# Patient Record
Sex: FEMALE | Race: WHITE | Employment: OTHER | URBAN - METROPOLITAN AREA
[De-identification: names, ages, dates, MRNs, and addresses within clinical notes are randomized per-mention and may not be internally consistent; named-entity substitution may affect disease eponyms.]

---

## 2019-09-24 ENCOUNTER — HOSPITAL ENCOUNTER (OUTPATIENT)
Age: 64
Setting detail: SPECIMEN
Discharge: HOME | End: 2019-09-24
Payer: COMMERCIAL

## 2019-09-25 LAB
CASE NUMBER:: NORMAL
SPECIMEN DESCRIPTION: NORMAL
SURGICAL PATHOLOGY REPORT: NORMAL

## 2019-12-23 ENCOUNTER — HOSPITAL ENCOUNTER (OUTPATIENT)
Age: 64
Setting detail: SPECIMEN
Discharge: HOME | End: 2019-12-23
Payer: COMMERCIAL

## 2019-12-23 LAB
ABSOLUTE EOS #: 0.18 K/UL (ref 0–0.44)
ABSOLUTE IMMATURE GRANULOCYTE: <0.03 K/UL (ref 0–0.3)
ABSOLUTE LYMPH #: 1.92 K/UL (ref 1.1–3.7)
ABSOLUTE MONO #: 0.65 K/UL (ref 0.1–1.2)
ANION GAP SERPL CALCULATED.3IONS-SCNC: 13 MMOL/L (ref 9–17)
BASOPHILS # BLD: 1 % (ref 0–2)
BASOPHILS ABSOLUTE: 0.07 K/UL (ref 0–0.2)
BUN BLDV-MCNC: 17 MG/DL (ref 8–23)
BUN/CREAT BLD: ABNORMAL (ref 9–20)
C-REACTIVE PROTEIN: 1.5 MG/L (ref 0–5)
CALCIUM SERPL-MCNC: 9.4 MG/DL (ref 8.6–10.4)
CHLORIDE BLD-SCNC: 101 MMOL/L (ref 98–107)
CO2: 27 MMOL/L (ref 20–31)
CREAT SERPL-MCNC: 0.55 MG/DL (ref 0.5–0.9)
DIFFERENTIAL TYPE: NORMAL
EOSINOPHILS RELATIVE PERCENT: 3 % (ref 1–4)
GFR AFRICAN AMERICAN: >60 ML/MIN
GFR NON-AFRICAN AMERICAN: >60 ML/MIN
GFR SERPL CREATININE-BSD FRML MDRD: ABNORMAL ML/MIN/{1.73_M2}
GFR SERPL CREATININE-BSD FRML MDRD: ABNORMAL ML/MIN/{1.73_M2}
GLUCOSE BLD-MCNC: 315 MG/DL (ref 70–99)
HCT VFR BLD CALC: 39.2 % (ref 36.3–47.1)
HEMOGLOBIN: 12.2 G/DL (ref 11.9–15.1)
IMMATURE GRANULOCYTES: 0 %
LYMPHOCYTES # BLD: 36 % (ref 24–43)
MCH RBC QN AUTO: 30.7 PG (ref 25.2–33.5)
MCHC RBC AUTO-ENTMCNC: 31.1 G/DL (ref 28.4–34.8)
MCV RBC AUTO: 98.5 FL (ref 82.6–102.9)
MONOCYTES # BLD: 12 % (ref 3–12)
NRBC AUTOMATED: 0 PER 100 WBC
PDW BLD-RTO: 14.4 % (ref 11.8–14.4)
PLATELET # BLD: 386 K/UL (ref 138–453)
PLATELET ESTIMATE: NORMAL
PMV BLD AUTO: 10.9 FL (ref 8.1–13.5)
POTASSIUM SERPL-SCNC: 5.2 MMOL/L (ref 3.7–5.3)
RBC # BLD: 3.98 M/UL (ref 3.95–5.11)
RBC # BLD: NORMAL 10*6/UL
SEG NEUTROPHILS: 48 % (ref 36–65)
SEGMENTED NEUTROPHILS ABSOLUTE COUNT: 2.49 K/UL (ref 1.5–8.1)
SODIUM BLD-SCNC: 141 MMOL/L (ref 135–144)
WBC # BLD: 5.3 K/UL (ref 3.5–11.3)
WBC # BLD: NORMAL 10*3/UL

## 2019-12-30 ENCOUNTER — TELEPHONE (OUTPATIENT)
Dept: INFECTIOUS DISEASES | Age: 64
End: 2019-12-30

## 2020-01-06 ENCOUNTER — HOSPITAL ENCOUNTER (OUTPATIENT)
Age: 65
Setting detail: SPECIMEN
Discharge: HOME | End: 2020-01-06
Payer: COMMERCIAL

## 2020-01-06 LAB
ABSOLUTE EOS #: 0.41 K/UL (ref 0–0.44)
ABSOLUTE IMMATURE GRANULOCYTE: <0.03 K/UL (ref 0–0.3)
ABSOLUTE LYMPH #: 1.52 K/UL (ref 1.1–3.7)
ABSOLUTE MONO #: 0.55 K/UL (ref 0.1–1.2)
ANION GAP SERPL CALCULATED.3IONS-SCNC: 13 MMOL/L (ref 9–17)
BASOPHILS # BLD: 1 % (ref 0–2)
BASOPHILS ABSOLUTE: 0.06 K/UL (ref 0–0.2)
BUN BLDV-MCNC: 24 MG/DL (ref 8–23)
BUN/CREAT BLD: ABNORMAL (ref 9–20)
C-REACTIVE PROTEIN: 0.5 MG/L (ref 0–5)
CALCIUM SERPL-MCNC: 9.3 MG/DL (ref 8.6–10.4)
CHLORIDE BLD-SCNC: 105 MMOL/L (ref 98–107)
CO2: 24 MMOL/L (ref 20–31)
CREAT SERPL-MCNC: 0.56 MG/DL (ref 0.5–0.9)
DIFFERENTIAL TYPE: ABNORMAL
EOSINOPHILS RELATIVE PERCENT: 6 % (ref 1–4)
GFR AFRICAN AMERICAN: >60 ML/MIN
GFR NON-AFRICAN AMERICAN: >60 ML/MIN
GFR SERPL CREATININE-BSD FRML MDRD: ABNORMAL ML/MIN/{1.73_M2}
GFR SERPL CREATININE-BSD FRML MDRD: ABNORMAL ML/MIN/{1.73_M2}
GLUCOSE BLD-MCNC: 256 MG/DL (ref 70–99)
HCT VFR BLD CALC: 44.6 % (ref 36.3–47.1)
HEMOGLOBIN: 13.3 G/DL (ref 11.9–15.1)
IMMATURE GRANULOCYTES: 0 %
LYMPHOCYTES # BLD: 23 % (ref 24–43)
MCH RBC QN AUTO: 30.4 PG (ref 25.2–33.5)
MCHC RBC AUTO-ENTMCNC: 29.8 G/DL (ref 28.4–34.8)
MCV RBC AUTO: 102.1 FL (ref 82.6–102.9)
MONOCYTES # BLD: 8 % (ref 3–12)
NRBC AUTOMATED: 0 PER 100 WBC
PDW BLD-RTO: 14.5 % (ref 11.8–14.4)
PLATELET # BLD: 203 K/UL (ref 138–453)
PLATELET ESTIMATE: ABNORMAL
PMV BLD AUTO: 11.9 FL (ref 8.1–13.5)
POTASSIUM SERPL-SCNC: 4.8 MMOL/L (ref 3.7–5.3)
RBC # BLD: 4.37 M/UL (ref 3.95–5.11)
RBC # BLD: ABNORMAL 10*6/UL
SEG NEUTROPHILS: 62 % (ref 36–65)
SEGMENTED NEUTROPHILS ABSOLUTE COUNT: 4.19 K/UL (ref 1.5–8.1)
SODIUM BLD-SCNC: 142 MMOL/L (ref 135–144)
WBC # BLD: 6.8 K/UL (ref 3.5–11.3)
WBC # BLD: ABNORMAL 10*3/UL

## 2020-01-08 ENCOUNTER — OFFICE VISIT (OUTPATIENT)
Dept: INFECTIOUS DISEASES | Age: 65
End: 2020-01-08
Payer: COMMERCIAL

## 2020-01-08 VITALS
DIASTOLIC BLOOD PRESSURE: 46 MMHG | HEART RATE: 87 BPM | WEIGHT: 134 LBS | TEMPERATURE: 98.4 F | BODY MASS INDEX: 25.3 KG/M2 | SYSTOLIC BLOOD PRESSURE: 83 MMHG | HEIGHT: 61 IN

## 2020-01-08 PROCEDURE — 99214 OFFICE O/P EST MOD 30 MIN: CPT | Performed by: INTERNAL MEDICINE

## 2020-01-08 RX ORDER — TRAZODONE HYDROCHLORIDE 50 MG/1
TABLET ORAL
COMMUNITY
Start: 2019-12-11

## 2020-01-08 RX ORDER — ESCITALOPRAM OXALATE 20 MG/1
TABLET ORAL
COMMUNITY
Start: 2019-12-11

## 2020-01-08 RX ORDER — ATORVASTATIN CALCIUM 80 MG/1
80 TABLET, FILM COATED ORAL
COMMUNITY
Start: 2015-03-19

## 2020-01-08 RX ORDER — CLINDAMYCIN HYDROCHLORIDE 300 MG/1
CAPSULE ORAL
COMMUNITY
Start: 2019-12-09

## 2020-01-08 ASSESSMENT — ENCOUNTER SYMPTOMS
WHEEZING: 1
CONSTIPATION: 1
COUGH: 1

## 2020-01-08 NOTE — PROGRESS NOTES
this on January 9, 2020. She comes in today and does not report any acute issues. She continues on the IV antimicrobial therapy and she is tolerating it well with no nausea vomiting or diarrhea. She does not have any pain at all at the left shoulder and the surgical incision is healing well with no cellulitis or drainage noted. She does report some chills occasionally, she also reports a chronic cough and wheezing at times. I have personally reviewedthe past medical history, medications, social history, and I have updated the database accordingly. Past Medical History:     Past Medical History:   Diagnosis Date    Arthritis     Asthma     [facility]estive [facility] failure) [facility]     COPD (chronic obstructive pulmonary disease) ([facility]     Diabetes mellitus ([facility]     GERD (gastroesophageal reflux disease)     Hypertension     MRSA (methicillin resistant staph aureus) culture positive     MSSA (methicillin susceptible Staphylococcus aureus)     Psychiatric problem     Shoulder abscess     MSSA     Medications:     Current Outpatient Medications   Medication Sig Dispense Refill    traZODone (DESYREL) 50 MG tablet       escitalopram (LEXAPRO) 20 MG tablet       clindamycin (CLEOCIN) 300 MG capsule take 2 capsules by mouth every 8 hours      atorvastatin (LIPITOR) 80 MG tablet Take 80 mg by mouth      HYDROcodone-acetaminophen (NORCO) 5-325 MG per tablet Take 1 tablet by mouth every 6 hours as needed for Pain      pantoprazole (PROTONIX) 40 MG tablet Take 1 tablet by mouth every morning (before breakfast). 30 tablet 0    Calcium Carb-Cholecalciferol (OYSTER SHELL CALCIUM/VITAMIN D) 250-125 MG-UNIT per tablet Take 1 tablet by mouth daily. 30 tablet 0    QUEtiapine (SEROQUEL) 200 MG tablet Take 1 tablet by mouth nightly. 60 tablet 0    sertraline (ZOLOFT) 50 MG tablet Take 1 tablet by mouth daily.  30 tablet 0    ipratropium-albuterol (DUONEB) 0.5-2.5 (3) MG/3ML SOLN nebulizer solution Inhale 3 mLs Palpations: Abdomen is soft. Comments: Obese abdomen   Skin:     General: Skin is warm and dry. Comments: The left anterior shoulder does have a healing skin lesion from previous I&D surgery   Neurological:      Mental Status: She is alert and oriented to person, place, and time. Laboratory studies :  Medical Decision Making:   I have independently reviewed the following labs:  CBC with Differential:  Lab Results   Component Value Date    WBC 6.8 01/06/2020    WBC 5.3 12/23/2019    HGB 13.3 01/06/2020    HGB 12.2 12/23/2019    HCT 44.6 01/06/2020    HCT 39.2 12/23/2019     01/06/2020     12/23/2019     12/05/2011     12/04/2011    LYMPHOPCT 23 01/06/2020    LYMPHOPCT 36 12/23/2019    MONOPCT 8 01/06/2020    MONOPCT 12 12/23/2019       BMP:  Lab Results   Component Value Date     01/06/2020     12/23/2019    K 4.8 01/06/2020    K 5.2 12/23/2019     01/06/2020     12/23/2019    CO2 24 01/06/2020    CO2 27 12/23/2019    BUN 24 01/06/2020    BUN 17 12/23/2019    CREATININE 0.56 01/06/2020    CREATININE 0.55 12/23/2019    MG 1.6 12/04/2011       Hepatic Function Panel:   Lab Results   Component Value Date    LABALBU 4.3 10/27/2012    BILITOT 0.35 10/27/2012    ALKPHOS 96 10/27/2012    ALT 27 10/27/2012    AST 16 10/27/2012       Lab Results   Component Value Date    CRP 0.5 01/06/2020     No results found for: Darlyn Graham      Thank you for allowing us to participate in the care of this patient. Pleasecall with questions. Louis Kenny MD  Perfect [employer] messaging: [phone]    This note is created with the assistance of a speech recognition program.  While intending to generate a document that actually reflects the content ofthe visit, the document can still have some errors including those of syntax and sound a like substitutions which may escape proof reading.   It such instances, actual meaning can be extrapolated by contextual diversion.

## 2020-01-08 NOTE — PATIENT INSTRUCTIONS
Please let [place] [facility] know that PICC can be removed after doses complete 1/9/20- letter provided- included with AVS  Please give to home care 1/9/20

## 2020-01-17 ENCOUNTER — TELEPHONE (OUTPATIENT)
Dept: INFECTIOUS DISEASES | Age: 65
End: 2020-01-17

## 2020-01-17 NOTE — TELEPHONE ENCOUNTER
THE PATIENT SISTER CALLED AND STATED SHE IS TAKING HER SISTER TO GET THE BLOOD DRAWN AT [facility] AND THEY NEED ORDERS I HAVE FAXED THEM [phone]

## 2020-02-19 ENCOUNTER — OFFICE VISIT (OUTPATIENT)
Dept: INFECTIOUS DISEASES | Age: 65
End: 2020-02-19
Payer: COMMERCIAL

## 2020-02-19 VITALS
OXYGEN SATURATION: 97 % | DIASTOLIC BLOOD PRESSURE: 50 MMHG | BODY MASS INDEX: 25.82 KG/M2 | WEIGHT: 136.6 LBS | HEART RATE: 93 BPM | TEMPERATURE: 96.6 F | SYSTOLIC BLOOD PRESSURE: 103 MMHG

## 2020-02-19 PROCEDURE — 99213 OFFICE O/P EST LOW 20 MIN: CPT | Performed by: INTERNAL MEDICINE

## 2020-02-19 NOTE — PROGRESS NOTES
[facility]  Office Progress Note  Today's Date and Time: 2/19/2020, 11:25 AM    Impression:     1. Abscess of left shoulder    2. Bacteremia due to methicillin susceptible Staphylococcus aureus (MSSA)         Recommendations   · The patient completed a 4-week course of intravenous cefazolin on 1/9/20   · She was to have 2 sets of blood cultures done as a surveillance measure and she never had these completed  · Patient has been off antimicrobial therapy for over a month now and is actually doing well with no signs of infection at the shoulder and no systemic signs of infection. · See no point in doing the surveillance blood cultures at this point in time  · She can otherwise follow-up with me on an as-needed basis. I have ordered the following medications/ labs:  No orders of the defined types were placed in this encounter. No orders of the defined types were placed in this encounter. Chief complaint/reason for consultation:     Chief Complaint   Patient presents with    Follow-up     4 week follow up         History of Present Illness:   Aggie Hart is a 62y.o.-year-old female who I am seeing in follow-up for a left shoulder infection with associated MSSA bacteremia. Lakisha Whitten has a history of diabetes mellitus, hypertension, COPD, CHF, MRSA skin and soft tissue infections and she was hospitalized at [facility] in mid December with MSSA sepsis and she also had an anterior shoulder abscess. She underwent surgical I&D with purulent material drained of the subcutaneous abscess with no joint involvement noted. The patient is seen and evaluated at bedside she is awake and alert in no acute distress. She does not report any subjective fever or chills. No cough or shortness of breath. No abdominal pain nausea vomiting or diarrhea. I have personally reviewedthe past medical history, medications, social history, and I have updated the database accordingly.   Past

## 2020-02-22 ASSESSMENT — ENCOUNTER SYMPTOMS
GASTROINTESTINAL NEGATIVE: 1
RESPIRATORY NEGATIVE: 1

## 2022-12-23 ENCOUNTER — APPOINTMENT (OUTPATIENT)
Dept: CT IMAGING | Age: 67
DRG: 177 | End: 2022-12-23
Payer: COMMERCIAL

## 2022-12-23 ENCOUNTER — APPOINTMENT (OUTPATIENT)
Dept: GENERAL RADIOLOGY | Age: 67
DRG: 177 | End: 2022-12-23
Payer: COMMERCIAL

## 2022-12-23 ENCOUNTER — HOSPITAL ENCOUNTER (INPATIENT)
Age: 67
LOS: 4 days | Discharge: HOME | DRG: 177 | End: 2022-12-27
Attending: EMERGENCY MEDICINE | Admitting: INTERNAL MEDICINE
Payer: COMMERCIAL

## 2022-12-23 DIAGNOSIS — J69.0 ASPIRATION PNEUMONIA DUE TO GASTRIC SECRETIONS, UNSPECIFIED LATERALITY, UNSPECIFIED PART OF LUNG (HCC): ICD-10-CM

## 2022-12-23 DIAGNOSIS — T40.2X4A OPIOID OVERDOSE, UNDETERMINED INTENT, INITIAL ENCOUNTER (HCC): ICD-10-CM

## 2022-12-23 DIAGNOSIS — E16.2 HYPOGLYCEMIA: ICD-10-CM

## 2022-12-23 DIAGNOSIS — R41.82 ALTERED MENTAL STATUS, UNSPECIFIED ALTERED MENTAL STATUS TYPE: Primary | ICD-10-CM

## 2022-12-23 LAB
ABSOLUTE EOS #: 0.3 K/UL (ref 0–0.44)
ABSOLUTE IMMATURE GRANULOCYTE: <0.03 K/UL (ref 0–0.3)
ABSOLUTE LYMPH #: 1.35 K/UL (ref 1.1–3.7)
ABSOLUTE MONO #: 0.89 K/UL (ref 0.1–1.2)
ALBUMIN SERPL-MCNC: 4.3 G/DL (ref 3.5–5.2)
ALBUMIN/GLOBULIN RATIO: 1.5 (ref 1–2.5)
ALP BLD-CCNC: 152 U/L (ref 35–104)
ALT SERPL-CCNC: 19 U/L (ref 5–33)
ANION GAP SERPL CALCULATED.3IONS-SCNC: 11 MMOL/L (ref 9–17)
AST SERPL-CCNC: 22 U/L
BASOPHILS # BLD: 0 % (ref 0–2)
BASOPHILS ABSOLUTE: 0.04 K/UL (ref 0–0.2)
BETA-HYDROXYBUTYRATE: 0.07 MMOL/L (ref 0.02–0.27)
BILIRUB SERPL-MCNC: 0.2 MG/DL (ref 0.3–1.2)
BILIRUBIN DIRECT: <0.1 MG/DL
BILIRUBIN, INDIRECT: ABNORMAL MG/DL (ref 0–1)
BUN BLDV-MCNC: 29 MG/DL (ref 8–23)
CALCIUM SERPL-MCNC: 9.7 MG/DL (ref 8.6–10.4)
CARBOXYHEMOGLOBIN: 8 % (ref 0–5)
CHLORIDE BLD-SCNC: 105 MMOL/L (ref 98–107)
CO2: 27 MMOL/L (ref 20–31)
CREAT SERPL-MCNC: 0.64 MG/DL (ref 0.5–0.9)
EOSINOPHILS RELATIVE PERCENT: 3 % (ref 1–4)
ETHANOL PERCENT: <0.01 %
ETHANOL: <10 MG/DL
FIO2: ABNORMAL
GFR SERPL CREATININE-BSD FRML MDRD: 60 ML/MIN/1.73M2
GLUCOSE BLD-MCNC: 36 MG/DL (ref 70–99)
GLUCOSE BLD-MCNC: 88 MG/DL
GLUCOSE BLD-MCNC: 88 MG/DL (ref 65–105)
GLUCOSE BLD-MCNC: 94 MG/DL
GLUCOSE BLD-MCNC: 94 MG/DL (ref 65–105)
HCO3 VENOUS: 28.2 MMOL/L (ref 24–30)
HCT VFR BLD CALC: 46.8 % (ref 36.3–47.1)
HEMOGLOBIN: 14.3 G/DL (ref 11.9–15.1)
IMMATURE GRANULOCYTES: 0 %
LACTIC ACID, WHOLE BLOOD: 1.7 MMOL/L (ref 0.7–2.1)
LIPASE: 16 U/L (ref 13–60)
LYMPHOCYTES # BLD: 12 % (ref 24–43)
MAGNESIUM: 2.2 MG/DL (ref 1.6–2.6)
MCH RBC QN AUTO: 30.4 PG (ref 25.2–33.5)
MCHC RBC AUTO-ENTMCNC: 30.6 G/DL (ref 28.4–34.8)
MCV RBC AUTO: 99.4 FL (ref 82.6–102.9)
MONOCYTES # BLD: 8 % (ref 3–12)
NRBC AUTOMATED: 0 PER 100 WBC
O2 SAT, VEN: 89.5 % (ref 60–85)
PATIENT TEMP: 37
PCO2, VEN: 59.8 MM HG (ref 39–55)
PDW BLD-RTO: 15.1 % (ref 11.8–14.4)
PH VENOUS: 7.3 (ref 7.32–7.42)
PLATELET # BLD: ABNORMAL K/UL (ref 138–453)
PLATELET, FLUORESCENCE: NORMAL K/UL (ref 138–453)
PO2, VEN: 57.9 MM HG (ref 30–50)
POSITIVE BASE EXCESS, VEN: 0.7 MMOL/L (ref 0–2)
POTASSIUM SERPL-SCNC: 4.6 MMOL/L (ref 3.7–5.3)
PRO-BNP: 65 PG/ML
RBC # BLD: 4.71 M/UL (ref 3.95–5.11)
RBC # BLD: ABNORMAL 10*6/UL
SEG NEUTROPHILS: 76 % (ref 36–65)
SEGMENTED NEUTROPHILS ABSOLUTE COUNT: 8.28 K/UL (ref 1.5–8.1)
SODIUM BLD-SCNC: 143 MMOL/L (ref 135–144)
TOTAL PROTEIN: 7.1 G/DL (ref 6.4–8.3)
TROPONIN, HIGH SENSITIVITY: 21 NG/L (ref 0–14)
TSH SERPL DL<=0.05 MIU/L-ACNC: 2.62 UIU/ML (ref 0.3–5)
WBC # BLD: 10.9 K/UL (ref 3.5–11.3)

## 2022-12-23 PROCEDURE — 84484 ASSAY OF TROPONIN QUANT: CPT

## 2022-12-23 PROCEDURE — 83735 ASSAY OF MAGNESIUM: CPT

## 2022-12-23 PROCEDURE — 99285 EMERGENCY DEPT VISIT HI MDM: CPT

## 2022-12-23 PROCEDURE — 83690 ASSAY OF LIPASE: CPT

## 2022-12-23 PROCEDURE — 82947 ASSAY GLUCOSE BLOOD QUANT: CPT

## 2022-12-23 PROCEDURE — 2000000000 HC ICU R&B

## 2022-12-23 PROCEDURE — 82550 ASSAY OF CK (CPK): CPT

## 2022-12-23 PROCEDURE — 83880 ASSAY OF NATRIURETIC PEPTIDE: CPT

## 2022-12-23 PROCEDURE — 6360000002 HC RX W HCPCS: Performed by: STUDENT IN AN ORGANIZED HEALTH CARE EDUCATION/TRAINING PROGRAM

## 2022-12-23 PROCEDURE — 82805 BLOOD GASES W/O2 SATURATION: CPT

## 2022-12-23 PROCEDURE — 83874 ASSAY OF MYOGLOBIN: CPT

## 2022-12-23 PROCEDURE — G0480 DRUG TEST DEF 1-7 CLASSES: HCPCS

## 2022-12-23 PROCEDURE — 85055 RETICULATED PLATELET ASSAY: CPT

## 2022-12-23 PROCEDURE — 71045 X-RAY EXAM CHEST 1 VIEW: CPT

## 2022-12-23 PROCEDURE — 82010 KETONE BODYS QUAN: CPT

## 2022-12-23 PROCEDURE — 82533 TOTAL CORTISOL: CPT

## 2022-12-23 PROCEDURE — 2580000003 HC RX 258: Performed by: STUDENT IN AN ORGANIZED HEALTH CARE EDUCATION/TRAINING PROGRAM

## 2022-12-23 PROCEDURE — 70450 CT HEAD/BRAIN W/O DYE: CPT

## 2022-12-23 PROCEDURE — 80048 BASIC METABOLIC PNL TOTAL CA: CPT

## 2022-12-23 PROCEDURE — 83605 ASSAY OF LACTIC ACID: CPT

## 2022-12-23 PROCEDURE — 85025 COMPLETE CBC W/AUTO DIFF WBC: CPT

## 2022-12-23 PROCEDURE — 93005 ELECTROCARDIOGRAM TRACING: CPT | Performed by: STUDENT IN AN ORGANIZED HEALTH CARE EDUCATION/TRAINING PROGRAM

## 2022-12-23 PROCEDURE — 80076 HEPATIC FUNCTION PANEL: CPT

## 2022-12-23 PROCEDURE — 87040 BLOOD CULTURE FOR BACTERIA: CPT

## 2022-12-23 PROCEDURE — 36415 COLL VENOUS BLD VENIPUNCTURE: CPT

## 2022-12-23 PROCEDURE — 84443 ASSAY THYROID STIM HORMONE: CPT

## 2022-12-23 PROCEDURE — 96372 THER/PROPH/DIAG INJ SC/IM: CPT

## 2022-12-23 PROCEDURE — 2500000003 HC RX 250 WO HCPCS

## 2022-12-23 RX ORDER — MAGNESIUM SULFATE IN WATER 40 MG/ML
2000 INJECTION, SOLUTION INTRAVENOUS ONCE
Status: COMPLETED | OUTPATIENT
Start: 2022-12-23 | End: 2022-12-24

## 2022-12-23 RX ORDER — 0.9 % SODIUM CHLORIDE 0.9 %
1000 INTRAVENOUS SOLUTION INTRAVENOUS ONCE
Status: COMPLETED | OUTPATIENT
Start: 2022-12-23 | End: 2022-12-24

## 2022-12-23 RX ORDER — HALOPERIDOL 5 MG/ML
5 INJECTION INTRAMUSCULAR ONCE
Status: COMPLETED | OUTPATIENT
Start: 2022-12-23 | End: 2022-12-23

## 2022-12-23 RX ORDER — DEXTROSE MONOHYDRATE 25 G/50ML
25 INJECTION, SOLUTION INTRAVENOUS ONCE
Status: COMPLETED | OUTPATIENT
Start: 2022-12-23 | End: 2022-12-23

## 2022-12-23 RX ORDER — DEXTROSE MONOHYDRATE 25 G/50ML
INJECTION, SOLUTION INTRAVENOUS
Status: COMPLETED
Start: 2022-12-23 | End: 2022-12-23

## 2022-12-23 RX ORDER — KETOROLAC TROMETHAMINE 15 MG/ML
15 INJECTION, SOLUTION INTRAMUSCULAR; INTRAVENOUS ONCE
Status: COMPLETED | OUTPATIENT
Start: 2022-12-23 | End: 2022-12-24

## 2022-12-23 RX ORDER — DEXTROSE MONOHYDRATE 100 MG/ML
INJECTION, SOLUTION INTRAVENOUS CONTINUOUS PRN
Status: DISCONTINUED | OUTPATIENT
Start: 2022-12-23 | End: 2022-12-27 | Stop reason: HOSPADM

## 2022-12-23 RX ADMIN — HALOPERIDOL LACTATE 5 MG: 5 INJECTION, SOLUTION INTRAMUSCULAR at 22:33

## 2022-12-23 RX ADMIN — DEXTROSE MONOHYDRATE 25 G: 25 INJECTION, SOLUTION INTRAVENOUS at 22:04

## 2022-12-23 RX ADMIN — SODIUM CHLORIDE 1000 ML: 9 INJECTION, SOLUTION INTRAVENOUS at 22:30

## 2022-12-24 ENCOUNTER — APPOINTMENT (OUTPATIENT)
Dept: GENERAL RADIOLOGY | Age: 67
DRG: 177 | End: 2022-12-24
Payer: COMMERCIAL

## 2022-12-24 PROBLEM — G92.8 TOXIC METABOLIC ENCEPHALOPATHY: Status: ACTIVE | Noted: 2022-12-24

## 2022-12-24 PROBLEM — I21.4 NSTEMI (NON-ST ELEVATED MYOCARDIAL INFARCTION) (HCC): Status: ACTIVE | Noted: 2022-12-24

## 2022-12-24 LAB
ABSOLUTE EOS #: 0.04 K/UL (ref 0–0.44)
ABSOLUTE IMMATURE GRANULOCYTE: 0.03 K/UL (ref 0–0.3)
ABSOLUTE LYMPH #: 1.12 K/UL (ref 1.1–3.7)
ABSOLUTE MONO #: 0.83 K/UL (ref 0.1–1.2)
ADENOVIRUS PCR: NOT DETECTED
ALBUMIN SERPL-MCNC: 3.4 G/DL (ref 3.5–5.2)
ALBUMIN/GLOBULIN RATIO: 1.4 (ref 1–2.5)
ALP BLD-CCNC: 123 U/L (ref 35–104)
ALT SERPL-CCNC: 15 U/L (ref 5–33)
AMPHETAMINE SCREEN URINE: NEGATIVE
ANION GAP SERPL CALCULATED.3IONS-SCNC: 10 MMOL/L (ref 9–17)
AST SERPL-CCNC: 25 U/L
BACTERIA: ABNORMAL
BARBITURATE SCREEN URINE: NEGATIVE
BASOPHILS # BLD: 0 % (ref 0–2)
BASOPHILS ABSOLUTE: 0.03 K/UL (ref 0–0.2)
BENZODIAZEPINE SCREEN, URINE: NEGATIVE
BILIRUB SERPL-MCNC: 0.3 MG/DL (ref 0.3–1.2)
BILIRUBIN URINE: NEGATIVE
BORDETELLA PARAPERTUSSIS: NOT DETECTED
BORDETELLA PERTUSSIS PCR: NOT DETECTED
BUN BLDV-MCNC: 27 MG/DL (ref 8–23)
CALCIUM IONIZED: 1.31 MMOL/L (ref 1.13–1.33)
CALCIUM SERPL-MCNC: 8.6 MG/DL (ref 8.6–10.4)
CANNABINOID SCREEN URINE: NEGATIVE
CASTS UA: ABNORMAL /LPF (ref 0–8)
CHLAMYDIA PNEUMONIAE BY PCR: NOT DETECTED
CHLORIDE BLD-SCNC: 108 MMOL/L (ref 98–107)
CO2: 24 MMOL/L (ref 20–31)
COCAINE METABOLITE, URINE: NEGATIVE
COLOR: YELLOW
CORONAVIRUS 229E PCR: NOT DETECTED
CORONAVIRUS HKU1 PCR: NOT DETECTED
CORONAVIRUS NL63 PCR: NOT DETECTED
CORONAVIRUS OC43 PCR: NOT DETECTED
CORTISOL: 37.8 UG/DL (ref 2.7–18.4)
CREAT SERPL-MCNC: 0.67 MG/DL (ref 0.5–0.9)
EKG ATRIAL RATE: 119 BPM
EKG ATRIAL RATE: 79 BPM
EKG ATRIAL RATE: 80 BPM
EKG P AXIS: 77 DEGREES
EKG P AXIS: 84 DEGREES
EKG P AXIS: 85 DEGREES
EKG P-R INTERVAL: 164 MS
EKG P-R INTERVAL: 168 MS
EKG P-R INTERVAL: 186 MS
EKG Q-T INTERVAL: 292 MS
EKG Q-T INTERVAL: 392 MS
EKG Q-T INTERVAL: 394 MS
EKG QRS DURATION: 80 MS
EKG QRS DURATION: 82 MS
EKG QRS DURATION: 88 MS
EKG QTC CALCULATION (BAZETT): 410 MS
EKG QTC CALCULATION (BAZETT): 449 MS
EKG QTC CALCULATION (BAZETT): 454 MS
EKG R AXIS: 70 DEGREES
EKG R AXIS: 80 DEGREES
EKG R AXIS: 80 DEGREES
EKG T AXIS: 28 DEGREES
EKG T AXIS: 87 DEGREES
EKG T AXIS: 91 DEGREES
EKG VENTRICULAR RATE: 119 BPM
EKG VENTRICULAR RATE: 79 BPM
EKG VENTRICULAR RATE: 80 BPM
EOSINOPHILS RELATIVE PERCENT: 1 % (ref 1–4)
EPITHELIAL CELLS UA: ABNORMAL /HPF (ref 0–5)
FENTANYL URINE: NEGATIVE
GFR SERPL CREATININE-BSD FRML MDRD: 60 ML/MIN/1.73M2
GLUCOSE BLD-MCNC: 102 MG/DL (ref 65–105)
GLUCOSE BLD-MCNC: 109 MG/DL (ref 70–99)
GLUCOSE BLD-MCNC: 117 MG/DL (ref 65–105)
GLUCOSE BLD-MCNC: 120 MG/DL (ref 65–105)
GLUCOSE BLD-MCNC: 122 MG/DL (ref 65–105)
GLUCOSE BLD-MCNC: 248 MG/DL (ref 65–105)
GLUCOSE BLD-MCNC: 260 MG/DL (ref 65–105)
GLUCOSE BLD-MCNC: 276 MG/DL (ref 65–105)
GLUCOSE BLD-MCNC: 98 MG/DL (ref 65–105)
GLUCOSE URINE: ABNORMAL
HCT VFR BLD CALC: 39.1 % (ref 36.3–47.1)
HEMOGLOBIN: 11.8 G/DL (ref 11.9–15.1)
HUMAN METAPNEUMOVIRUS PCR: NOT DETECTED
IMMATURE GRANULOCYTES: 0 %
INFLUENZA A BY PCR: NOT DETECTED
INFLUENZA B BY PCR: NOT DETECTED
KETONES, URINE: NEGATIVE
LACTIC ACID, WHOLE BLOOD: 0.8 MMOL/L (ref 0.7–2.1)
LEUKOCYTE ESTERASE, URINE: NEGATIVE
LYMPHOCYTES # BLD: 13 % (ref 24–43)
MAGNESIUM: 2.7 MG/DL (ref 1.6–2.6)
MCH RBC QN AUTO: 30.1 PG (ref 25.2–33.5)
MCHC RBC AUTO-ENTMCNC: 30.2 G/DL (ref 28.4–34.8)
MCV RBC AUTO: 99.7 FL (ref 82.6–102.9)
METHADONE SCREEN, URINE: NEGATIVE
MONOCYTES # BLD: 10 % (ref 3–12)
MRSA, DNA, NASAL: NEGATIVE
MYCOPLASMA PNEUMONIAE PCR: NOT DETECTED
MYOGLOBIN: 333 NG/ML (ref 25–58)
NITRITE, URINE: NEGATIVE
NRBC AUTOMATED: 0 PER 100 WBC
OPIATES, URINE: NEGATIVE
OXYCODONE SCREEN URINE: NEGATIVE
PARAINFLUENZA 1 PCR: NOT DETECTED
PARAINFLUENZA 2 PCR: NOT DETECTED
PARAINFLUENZA 3 PCR: NOT DETECTED
PARAINFLUENZA 4 PCR: NOT DETECTED
PARTIAL THROMBOPLASTIN TIME: 23.9 SEC (ref 20.5–30.5)
PARTIAL THROMBOPLASTIN TIME: 48.1 SEC (ref 20.5–30.5)
PARTIAL THROMBOPLASTIN TIME: 55.2 SEC (ref 20.5–30.5)
PDW BLD-RTO: 14.9 % (ref 11.8–14.4)
PH UA: 7 (ref 5–8)
PHENCYCLIDINE, URINE: NEGATIVE
PLATELET # BLD: 144 K/UL (ref 138–453)
PMV BLD AUTO: 12.2 FL (ref 8.1–13.5)
POTASSIUM SERPL-SCNC: 4.6 MMOL/L (ref 3.7–5.3)
PROTEIN UA: ABNORMAL
RBC # BLD: 3.92 M/UL (ref 3.95–5.11)
RBC # BLD: ABNORMAL 10*6/UL
RBC UA: ABNORMAL /HPF (ref 0–4)
RESP SYNCYTIAL VIRUS PCR: NOT DETECTED
RHINO/ENTEROVIRUS PCR: NOT DETECTED
SARS-COV-2, PCR: NOT DETECTED
SARS-COV-2, RAPID: NOT DETECTED
SEG NEUTROPHILS: 76 % (ref 36–65)
SEGMENTED NEUTROPHILS ABSOLUTE COUNT: 6.58 K/UL (ref 1.5–8.1)
SODIUM BLD-SCNC: 142 MMOL/L (ref 135–144)
SPECIFIC GRAVITY UA: 1.01 (ref 1–1.03)
SPECIMEN DESCRIPTION: NORMAL
TEST INFORMATION: NORMAL
TOTAL CK: 72 U/L (ref 26–192)
TOTAL PROTEIN: 5.8 G/DL (ref 6.4–8.3)
TROPONIN, HIGH SENSITIVITY: 134 NG/L (ref 0–14)
TROPONIN, HIGH SENSITIVITY: 194 NG/L (ref 0–14)
TROPONIN, HIGH SENSITIVITY: 210 NG/L (ref 0–14)
TURBIDITY: CLEAR
URINE HGB: NEGATIVE
UROBILINOGEN, URINE: NORMAL
WBC # BLD: 8.6 K/UL (ref 3.5–11.3)
WBC UA: ABNORMAL /HPF (ref 0–5)

## 2022-12-24 PROCEDURE — 87641 MR-STAPH DNA AMP PROBE: CPT

## 2022-12-24 PROCEDURE — 94761 N-INVAS EAR/PLS OXIMETRY MLT: CPT

## 2022-12-24 PROCEDURE — 36415 COLL VENOUS BLD VENIPUNCTURE: CPT

## 2022-12-24 PROCEDURE — 2580000003 HC RX 258: Performed by: STUDENT IN AN ORGANIZED HEALTH CARE EDUCATION/TRAINING PROGRAM

## 2022-12-24 PROCEDURE — 85025 COMPLETE CBC W/AUTO DIFF WBC: CPT

## 2022-12-24 PROCEDURE — 84484 ASSAY OF TROPONIN QUANT: CPT

## 2022-12-24 PROCEDURE — 82947 ASSAY GLUCOSE BLOOD QUANT: CPT

## 2022-12-24 PROCEDURE — 82330 ASSAY OF CALCIUM: CPT

## 2022-12-24 PROCEDURE — 80307 DRUG TEST PRSMV CHEM ANLYZR: CPT

## 2022-12-24 PROCEDURE — 83735 ASSAY OF MAGNESIUM: CPT

## 2022-12-24 PROCEDURE — 80053 COMPREHEN METABOLIC PANEL: CPT

## 2022-12-24 PROCEDURE — 2700000000 HC OXYGEN THERAPY PER DAY

## 2022-12-24 PROCEDURE — 71045 X-RAY EXAM CHEST 1 VIEW: CPT

## 2022-12-24 PROCEDURE — 81001 URINALYSIS AUTO W/SCOPE: CPT

## 2022-12-24 PROCEDURE — 6360000002 HC RX W HCPCS: Performed by: STUDENT IN AN ORGANIZED HEALTH CARE EDUCATION/TRAINING PROGRAM

## 2022-12-24 PROCEDURE — 93010 ELECTROCARDIOGRAM REPORT: CPT | Performed by: INTERNAL MEDICINE

## 2022-12-24 PROCEDURE — 6370000000 HC RX 637 (ALT 250 FOR IP)

## 2022-12-24 PROCEDURE — 93005 ELECTROCARDIOGRAM TRACING: CPT

## 2022-12-24 PROCEDURE — 6360000002 HC RX W HCPCS

## 2022-12-24 PROCEDURE — 2580000003 HC RX 258

## 2022-12-24 PROCEDURE — 93005 ELECTROCARDIOGRAM TRACING: CPT | Performed by: INTERNAL MEDICINE

## 2022-12-24 PROCEDURE — 0202U NFCT DS 22 TRGT SARS-COV-2: CPT

## 2022-12-24 PROCEDURE — 1200000000 HC SEMI PRIVATE

## 2022-12-24 PROCEDURE — 83605 ASSAY OF LACTIC ACID: CPT

## 2022-12-24 PROCEDURE — 87635 SARS-COV-2 COVID-19 AMP PRB: CPT

## 2022-12-24 PROCEDURE — 85730 THROMBOPLASTIN TIME PARTIAL: CPT

## 2022-12-24 PROCEDURE — 99291 CRITICAL CARE FIRST HOUR: CPT | Performed by: INTERNAL MEDICINE

## 2022-12-24 PROCEDURE — 93306 TTE W/DOPPLER COMPLETE: CPT

## 2022-12-24 RX ORDER — ATORVASTATIN CALCIUM 40 MG/1
40 TABLET, FILM COATED ORAL NIGHTLY
Status: DISCONTINUED | OUTPATIENT
Start: 2022-12-24 | End: 2022-12-27 | Stop reason: HOSPADM

## 2022-12-24 RX ORDER — SODIUM CHLORIDE 0.9 % (FLUSH) 0.9 %
5-40 SYRINGE (ML) INJECTION PRN
Status: DISCONTINUED | OUTPATIENT
Start: 2022-12-24 | End: 2022-12-27 | Stop reason: HOSPADM

## 2022-12-24 RX ORDER — SODIUM CHLORIDE 9 MG/ML
1000 INJECTION, SOLUTION INTRAVENOUS CONTINUOUS
Status: ACTIVE | OUTPATIENT
Start: 2022-12-24 | End: 2022-12-24

## 2022-12-24 RX ORDER — FLUTICASONE FUROATE, UMECLIDINIUM BROMIDE AND VILANTEROL TRIFENATATE 200; 62.5; 25 UG/1; UG/1; UG/1
1 POWDER RESPIRATORY (INHALATION) EVERY 12 HOURS
COMMUNITY

## 2022-12-24 RX ORDER — SUBCUTANEOUS INSULIN PUMP
EACH MISCELLANEOUS
COMMUNITY

## 2022-12-24 RX ORDER — SODIUM CHLORIDE 9 MG/ML
INJECTION, SOLUTION INTRAVENOUS PRN
Status: DISCONTINUED | OUTPATIENT
Start: 2022-12-24 | End: 2022-12-27 | Stop reason: HOSPADM

## 2022-12-24 RX ORDER — HEPARIN SODIUM 1000 [USP'U]/ML
30 INJECTION, SOLUTION INTRAVENOUS; SUBCUTANEOUS PRN
Status: DISCONTINUED | OUTPATIENT
Start: 2022-12-24 | End: 2022-12-26 | Stop reason: SDUPTHER

## 2022-12-24 RX ORDER — MONTELUKAST SODIUM 10 MG/1
10 TABLET ORAL NIGHTLY
COMMUNITY

## 2022-12-24 RX ORDER — SODIUM CHLORIDE 0.9 % (FLUSH) 0.9 %
5-40 SYRINGE (ML) INJECTION EVERY 12 HOURS SCHEDULED
Status: DISCONTINUED | OUTPATIENT
Start: 2022-12-24 | End: 2022-12-27 | Stop reason: HOSPADM

## 2022-12-24 RX ORDER — IPRATROPIUM BROMIDE AND ALBUTEROL SULFATE 2.5; .5 MG/3ML; MG/3ML
1 SOLUTION RESPIRATORY (INHALATION)
Status: DISCONTINUED | OUTPATIENT
Start: 2022-12-24 | End: 2022-12-24

## 2022-12-24 RX ORDER — HEPARIN SODIUM AND DEXTROSE 10000; 5 [USP'U]/100ML; G/100ML
5-30 INJECTION INTRAVENOUS CONTINUOUS
Status: DISCONTINUED | OUTPATIENT
Start: 2022-12-24 | End: 2022-12-26

## 2022-12-24 RX ORDER — ONDANSETRON 2 MG/ML
4 INJECTION INTRAMUSCULAR; INTRAVENOUS EVERY 6 HOURS PRN
Status: DISCONTINUED | OUTPATIENT
Start: 2022-12-24 | End: 2022-12-27 | Stop reason: HOSPADM

## 2022-12-24 RX ORDER — ACETAMINOPHEN 650 MG/1
650 SUPPOSITORY RECTAL EVERY 6 HOURS PRN
Status: DISCONTINUED | OUTPATIENT
Start: 2022-12-24 | End: 2022-12-27 | Stop reason: HOSPADM

## 2022-12-24 RX ORDER — INSULIN LISPRO 100 [IU]/ML
0-4 INJECTION, SOLUTION INTRAVENOUS; SUBCUTANEOUS NIGHTLY
Status: DISCONTINUED | OUTPATIENT
Start: 2022-12-24 | End: 2022-12-26

## 2022-12-24 RX ORDER — HEPARIN SODIUM 1000 [USP'U]/ML
60 INJECTION, SOLUTION INTRAVENOUS; SUBCUTANEOUS PRN
Status: DISCONTINUED | OUTPATIENT
Start: 2022-12-24 | End: 2022-12-26 | Stop reason: SDUPTHER

## 2022-12-24 RX ORDER — ASPIRIN 81 MG/1
81 TABLET, CHEWABLE ORAL DAILY
Status: DISCONTINUED | OUTPATIENT
Start: 2022-12-24 | End: 2022-12-27 | Stop reason: HOSPADM

## 2022-12-24 RX ORDER — INSULIN LISPRO 100 [IU]/ML
0-8 INJECTION, SOLUTION INTRAVENOUS; SUBCUTANEOUS
Status: DISCONTINUED | OUTPATIENT
Start: 2022-12-24 | End: 2022-12-26

## 2022-12-24 RX ORDER — ALBUTEROL SULFATE 2.5 MG/3ML
2.5 SOLUTION RESPIRATORY (INHALATION) EVERY 6 HOURS PRN
Status: DISCONTINUED | OUTPATIENT
Start: 2022-12-24 | End: 2022-12-27 | Stop reason: HOSPADM

## 2022-12-24 RX ORDER — ONDANSETRON 4 MG/1
4 TABLET, ORALLY DISINTEGRATING ORAL EVERY 8 HOURS PRN
Status: DISCONTINUED | OUTPATIENT
Start: 2022-12-24 | End: 2022-12-27 | Stop reason: HOSPADM

## 2022-12-24 RX ORDER — POLYETHYLENE GLYCOL 3350 17 G/17G
17 POWDER, FOR SOLUTION ORAL DAILY PRN
Status: DISCONTINUED | OUTPATIENT
Start: 2022-12-24 | End: 2022-12-27 | Stop reason: HOSPADM

## 2022-12-24 RX ORDER — HEPARIN SODIUM 1000 [USP'U]/ML
60 INJECTION, SOLUTION INTRAVENOUS; SUBCUTANEOUS ONCE
Status: COMPLETED | OUTPATIENT
Start: 2022-12-24 | End: 2022-12-24

## 2022-12-24 RX ORDER — ACETAMINOPHEN 325 MG/1
650 TABLET ORAL EVERY 6 HOURS PRN
Status: DISCONTINUED | OUTPATIENT
Start: 2022-12-24 | End: 2022-12-27 | Stop reason: HOSPADM

## 2022-12-24 RX ORDER — GUAIFENESIN 600 MG/1
1200 TABLET, EXTENDED RELEASE ORAL EVERY 12 HOURS PRN
COMMUNITY

## 2022-12-24 RX ORDER — ENOXAPARIN SODIUM 100 MG/ML
40 INJECTION SUBCUTANEOUS DAILY
Status: DISCONTINUED | OUTPATIENT
Start: 2022-12-24 | End: 2022-12-24

## 2022-12-24 RX ORDER — CHOLECALCIFEROL (VITAMIN D3) 1250 MCG
1 CAPSULE ORAL
COMMUNITY

## 2022-12-24 RX ORDER — DEXTROSE AND SODIUM CHLORIDE 5; .45 G/100ML; G/100ML
INJECTION, SOLUTION INTRAVENOUS CONTINUOUS
Status: DISCONTINUED | OUTPATIENT
Start: 2022-12-24 | End: 2022-12-24

## 2022-12-24 RX ADMIN — HEPARIN SODIUM 1970 UNITS: 1000 INJECTION INTRAVENOUS; SUBCUTANEOUS at 15:13

## 2022-12-24 RX ADMIN — PIPERACILLIN AND TAZOBACTAM 3375 MG: 3; .375 INJECTION, POWDER, FOR SOLUTION INTRAVENOUS at 00:13

## 2022-12-24 RX ADMIN — AMPICILLIN SODIUM AND SULBACTAM SODIUM 3000 MG: 2; 1 INJECTION, POWDER, FOR SOLUTION INTRAMUSCULAR; INTRAVENOUS at 10:40

## 2022-12-24 RX ADMIN — SODIUM CHLORIDE: 9 INJECTION, SOLUTION INTRAVENOUS at 10:39

## 2022-12-24 RX ADMIN — KETOROLAC TROMETHAMINE 15 MG: 15 INJECTION, SOLUTION INTRAMUSCULAR; INTRAVENOUS at 01:51

## 2022-12-24 RX ADMIN — SODIUM CHLORIDE, PRESERVATIVE FREE 10 ML: 5 INJECTION INTRAVENOUS at 08:29

## 2022-12-24 RX ADMIN — MAGNESIUM SULFATE HEPTAHYDRATE 2000 MG: 40 INJECTION, SOLUTION INTRAVENOUS at 01:57

## 2022-12-24 RX ADMIN — HEPARIN SODIUM 3950 UNITS: 1000 INJECTION INTRAVENOUS; SUBCUTANEOUS at 08:25

## 2022-12-24 RX ADMIN — HEPARIN SODIUM 12 UNITS/KG/HR: 10000 INJECTION, SOLUTION INTRAVENOUS at 08:28

## 2022-12-24 RX ADMIN — INSULIN LISPRO 2 UNITS: 100 INJECTION, SOLUTION INTRAVENOUS; SUBCUTANEOUS at 16:46

## 2022-12-24 RX ADMIN — AMPICILLIN SODIUM AND SULBACTAM SODIUM 3000 MG: 2; 1 INJECTION, POWDER, FOR SOLUTION INTRAMUSCULAR; INTRAVENOUS at 16:44

## 2022-12-24 RX ADMIN — ASPIRIN 81 MG: 81 TABLET, CHEWABLE ORAL at 12:12

## 2022-12-24 RX ADMIN — AMPICILLIN SODIUM AND SULBACTAM SODIUM 3000 MG: 2; 1 INJECTION, POWDER, FOR SOLUTION INTRAMUSCULAR; INTRAVENOUS at 23:12

## 2022-12-24 RX ADMIN — DESMOPRESSIN ACETATE 40 MG: 0.2 TABLET ORAL at 21:31

## 2022-12-24 RX ADMIN — Medication 1000 MG: at 02:03

## 2022-12-24 ASSESSMENT — PAIN SCALES - GENERAL
PAINLEVEL_OUTOF10: 0

## 2022-12-24 NOTE — H&P
Critical Care - History and Physical Examination    Patient's name:  [facility] Record Number: [id number]  Patient's account/billing number: [de-identified]  Patient's YOB: 1879  Age: 81 y.o. Date of Admission: 12/23/2022  8:05 PM  Reason of ICU admission: respiratory distress  Date of History and Physical Examination: 12/24/2022    Primary Care Physician: No primary care provider on file. Attending Physician: Dr. Piyush Bansal Status: No Order    Chief complaint:   Chief Complaint   Patient presents with    Altered Mental Status       Reason for ICU admission: Respiratory distress    History Of Present Illness:   History was obtained from chart review. Patient presented to the ED after being found down at home for an unknown period of time. EMS administered narcan with some improvement. On arrival to the ED patient was obtunded and tachycardic, tachypneic, and hypothermic. ED w/u demonstrated negative CTH, mild pulmonary vascular congestion, respiratory acidosis and hypoglycemia. Patient was noted to be wearing an insulin pump and suspected to have accidentally bolused herself insulin. Uncertain if unresponsiveness was secondary to drug use or hypoglycemia or other. No family present to corroborate history. She is requiring 10L supplemental O2 and there is concern for airway compromise, so ED is recommending ICU admission. Past Medical History:    No past medical history on file. Past Surgical History:    No past surgical history on file. Allergies:    Not on File      Home Medications:   Prior to Admission medications    Not on File       Social History:   TOBACCO:   has no history on file for tobacco use. ETOH:   has no history on file for alcohol use. DRUGS:  has no history on file for drug use. OCCUPATION:      Family History:   No family history on file.     REVIEW OF SYSTEMS (ROS):  Review of Systems   Unable to perform ROS: Mental status change     Physical Exam: Vitals: BP (!) 150/78   Pulse 98   Temp (!) 94 °F (34.4 °C)   Resp 28   Wt 145 lb (65.8 kg)   SpO2 99%     Body weight:   Wt Readings from Last 3 Encounters:   12/23/22 145 lb (65.8 kg)       Body Mass Index : There is no height or weight on file to calculate BMI. PHYSICAL EXAMINATION :  Constitutional: WDWN, NAD  EENT: PERRLA, EOMI, sclera clear, anicteric, moist mucous membranes, oropharynx clear, no lesions  Neck: Supple, symmetrical, trachea midline  Respiratory: coarse breaht sounds worse on the left, on non-rebreather  Cardiovascular: regular rate and rhythm, normal S1, S2, no murmur noted, and 2+ distal pulses in all 4 extremities   Abdomen: soft, nontender, nondistended, no masses or organomegaly  Neurological: no focal deficits, moves all 4 extremities spontaneously and purposefully, somewhat confused   Extremities:  peripheral pulses normal, no pedal edema, no clubbing or cyanosis    Laboratory findings:-  CBC:   Recent Labs     12/23/22 2119   WBC 10.9   HGB 14.3   PLT See Reflexed IPF Result     BMP:    Recent Labs     12/23/22 2119 12/23/22  2321     --    K 4.6  --      --    CO2 27  --    BUN 29*  --    CREATININE 0.64  --    GLUCOSE 36* 88     S. Calcium:  Recent Labs     12/23/22 2119   CALCIUM 9.7     S. Ionized Calcium:No results for input(s): IONCA in the last 72 hours. S. Magnesium:  Recent Labs     12/23/22 2119   MG 2.2     S. Phosphorus:No results for input(s): PHOS in the last 72 hours. S. Glucose:  Recent Labs     12/23/22  2019 12/23/22  2321   POCGLU 94 88     Glycosylated hemoglobin A1C: No results for input(s): LABA1C in the last 72 hours. INR: No results for input(s): INR in the last 72 hours. Hepatic functions:   Recent Labs     12/23/22 2119   ALKPHOS 152*   ALT 19   AST 22   PROT 7.1   BILITOT 0.2*   BILIDIR <0.1   LABALBU 4.3     Pancreatic functions:No results for input(s): LACTA, AMYLASE in the last 72 hours.   S. Lactic Acid: No results for input(s): LACTA in the last 72 hours. Cardiac enzymes:No results for input(s): CKTOTAL, CKMB, CKMBINDEX, TROPONINI in the last 72 hours. BNP:No results for input(s): BNP in the last 72 hours. Lipid profile: No results for input(s): CHOL, TRIG, HDL, LDL, LDLCALC in the last 72 hours. Blood Gases: No results found for: PH, PCO2, PO2, HCO3, O2SAT  Thyroid functions:   Lab Results   Component Value Date/Time    TSH 2.62 12/23/2022 09:19 PM        Urinalysis: Urine pending    Microbiology:  Cultures during this admission:   Blood cultures:                 [] None drawn      [] Negative             []  Positive (Details: 12/24/22 pending )  Urine Culture:                   [] None drawn      [] Negative             []  Positive (Details: 12/24/22 pending )  Sputum Culture:               [x] None drawn       [] Negative             []  Positive (Details:  )   Endotracheal aspirate:     [x] None drawn       [] Negative             []  Positive (Details:  )   -----------------------------------------------------------------  Radiological reports:    CXR: mild vascular congestion 12/23/22    Electrocardiogram: EKG: to be reviewed.     Last Echocardiogram findings: none    Assessment and Plan     Patient Active Problem List   Diagnosis    AMS (altered mental status)       Additional assessment:  Zak Zhang is a 81 y.o. female who intially presented on 12/23/2022 for   Chief Complaint   Patient presents with    Altered Mental Status       PLAN/MEDICAL DECISION MAKING:  Neurologic:   Neuro checks per protocol  Obtunded  F/u urine tox  Cardiovascular:  HR: 115  MAP:93  MAP goal >65  EKG to be reviewed  Slightly elevated troponin, to trend  Pulmonary:  Maintain oxygen sats >92%  Pulmonary toilet  Requiring 10L supplemental O2, close airway watch, may require intubation  Most recent CXR 12/23 w/pulmonary vascular congestion  Respiratory acidosis  Possible aspiration pneumonitis  GI/Nutrition  Ulcer Prophylaxis:  none indicated  Diet:No diet orders on file  Renal/Fluid/Electrolyte  IV Fluids: 0.9NS @ 100 mL/Hr   I/O: No intake/output data recorded.   BUN/Cr: 219/0.64  Monitor electrolytes, replace PRN   CK normal  ID  WBC:   Lab Results   Component Value Date    WBC 10.9 12/23/2022     Tmax:   Antimicrobials: vanc/zosyn given in ER  Hematology:  Recent Labs     12/23/22 2119   HGB 14.3      Endocrine:   glucose controlled - most recent BGL is   Recent Labs     12/23/22 2030 12/23/22 2119 12/23/22  2321   GLUCOSE 94 36* 88   Hx DM, insulin pump  Monitor BG  Elevated cortisol 37.8  TSH normal  DVT Prophylaxis  SCD sleeves -thigh high and lovenox  Discharge Needs:  PT, OT, ST, SW, and Case Management      CODE STATUS: No Order    DISPOSITION:  [x] To remain ICU:   [] OK for out of ICU from Critical Care standpoint    ---  Gerry Pablo DO, Lu[address] Tirso 88  Emergency Medicine Resident  [facility]  12/24/22 12:07 AM

## 2022-12-24 NOTE — PROGRESS NOTES
Pt more Alert and Oriented. Knows date, place and name. Mireille Bradly   [birth year]    Reportedly lives with sister, Army Forman.

## 2022-12-24 NOTE — PROGRESS NOTES
Transfer report given to MIGUEL ANGEL ZAIDI RN from [facility]. Sister Leanna Conner notified of pt's impending transfer to . No further questions at this time.

## 2022-12-24 NOTE — CONSULTS
[facility]    Consult / H&P               Today's Date: 12/24/2022  Patient Name: Severiano Moreno  Date of admission: 12/23/2022  8:05 PM  Patient's age: 78 y.o., 1955  Admission Dx: Hypoglycemia [E16.2]  Opioid overdose, undetermined intent, initial encounter ([facility] [T40.2X4A]  Altered mental status, unspecified altered mental status type [R41.82]  AMS (altered mental status) [R41.82]    Reason for Consult:  Cardiac evaluation    Requesting Physician: Lin Caldwell MD    CHIEF COMPLAINT: Altered mental status    History Obtained From:  electronic medical record    HISTORY OF PRESENT ILLNESS:    Cl Merry Moritz is a 81 y.o. female with no medical record on file. She was presented with altered mental status. As per report patient was found unconscious at home for unknown downtime by family member. When EMS arrived she was given 6 mg of Narcan with partial response. We do not have any medical record available in the [facility] or Care Everywhere. Patient was obtunded on arrival and was on 10 L nonrebreather mask maintaining good oxygen saturation. She does follow command, open eyes to voice. She was also found to have insulin pump in place to her abdomen. Cardiology was consulted because initial set of troponin was 21 which is now up trended to 210. Creatinine is normal.  Patient was started on IV heparin. Past Medical History:   has a past medical history of DMII (diabetes mellitus, type 2) ([facility] Past Surgical History:   has no past surgical history on file. Home Medications:    Prior to Admission medications    Not on File       Allergies:  Patient has no allergy information on record. Social History:        Family History: family history is not on file. REVIEW OF SYSTEMS:    Unable to obtain as patient is confused.       PHYSICAL EXAM:      BP (!) 109/53   Pulse 94   Temp 98.1 °F (36.7 °C) (Oral)   Resp 15   Wt 145 lb (65.8 kg)   SpO2 95%    Constitutional and General Appearance: Altered. On nonrebreather mask. HEENT: PERRL, no cervical lymphadenopathy. No masses palpable. Normal oral mucosa  Respiratory:  Resp Auscultation: Good respiratory effort. No for increased work of breathing. On auscultation: clear to auscultation bilaterally  Cardiovascular: The apical impulse is not displaced  regular S1 and S2.  Jugular venous pulsation Normal  Peripheral pulses are symmetrical and full   Abdomen:   No masses or tenderness  Bowel sounds present  Extremities:   No Cyanosis or Clubbing   Lower extremity edema: No   Skin: Warm and dry  Neurological:  Deferred     DATA:     ECHO:   ordered, but not yet obtained. Stress Test:   not obtained. Cardiac Angiography:   not obtained. Labs:   CBC:   Recent Labs     12/23/22 2119 12/24/22  0525   WBC 10.9 8.6   HGB 14.3 11.8*   HCT 46.8 39.1   PLT See Reflexed IPF Result 144     BMP:   Recent Labs     12/23/22 2119 12/23/22 2321 12/24/22  0525     --  142   K 4.6  --  4.6   CO2 27  --  24   BUN 29*  --  27*   CREATININE 0.64  --  0.67   LABGLOM 60*  --  60*   GLUCOSE 36* 88 109*     BNP: No results for input(s): BNP in the last 72 hours. PT/INR: No results for input(s): PROTIME, INR in the last 72 hours. APTT:  Recent Labs     12/24/22  0711   APTT 23.9     CARDIAC ENZYMES:  Recent Labs     12/23/22 2119   CKTOTAL 72     FASTING LIPID PANEL:No results found for: HDL, LDLDIRECT, LDLCALC, TRIG  LIVER PROFILE:  Recent Labs     12/23/22 2119 12/24/22  0525   AST 22 25   ALT 19 15   LABALBU 4.3 3.4*       IMPRESSION:    NSTEMI  Acute toxic metabolic encephalopathy  On medical record on file       RECOMMENDATIONS:  Continue IV heparin  Continue to trend troponin every 6 hours  Obtain 2D echo  Will wait for chart to merge to look more into her medical record. Start aspirin and Lipitor if no contraindication. Will continue to follow.       Final recommendation after discussion with rounding attending       Sailaja Kern MD. PGY- 4  Cardiology Fellow  [facility], [facility], [place]  12/24/2022, 9:42 AM        Attending Physician Statement:    I have discussed the care of  Belgica English , including pertinent history and exam findings, with the Cardiology fellow/resident. I have seen and examined the patient and the key elements of all parts of the encounter have been performed by me. I agree with the assessment, plan and orders as documented by the fellow/resident, after I modified exam findings and plan of treatments, and the final version is my approved version of the assessment. Additional Comments: Patient was found down. Narcan given. She denies any chest pain. No ischemic changed on ECG. HS trop elevation concerning for NSTEMI. ASA, IV heparin, statin. TTE ordered.     Capri Sesay MD

## 2022-12-24 NOTE — H&P
Cause-continue to monitor    Patient is on heparin infusion  Total critical care time caring for this patient with life threatening, unstable organ failure, including direct patient contact, management of life support systems, review of data including imaging and labs, discussions with other team members and physicians at least 27  Min so far today, excluding procedures.         Sunil Richards MD  12/24/2022  8:28 AM

## 2022-12-24 NOTE — PROGRESS NOTES
Critical care team - Resident sign-out to medicine service      Date and time: 12/24/2022 8:59 AM  Patient's name:  Maribel Meng Record Number: [id number]  Patient's account/billing number: [de-identified]  Patient's YOB: 1955  Age: 78 y.o. Date of Admission: 12/23/2022  8:05 PM  Length of stay during current admission: 1    Primary Care Physician: No primary care provider on file. Code Status: Full Code    Mode of physician to physician communication:        [] Via telephone   [] In person     Date and time of sign-out: 12/24/2022 8:59 AM    Accepting Medicine team: Intermed    Accepting team's attending: Dr. Jacob Manley    Patient's current ICU Bed:  [id number]     Patient's assigned bed on floor:  [facility]        [x] Med-Surg [] Step-down       [] Psychiatry ICU       [] Psych floor     Reason for ICU admission:     Metabolic encephalopathy    ICU course summary:     Patient presented yesterday after being found down unresponsive, was admitted to ICU for close airway watch as patient was altered upon arrival to the ED requiring 10L nonrebreather. Urine tox was negative. Patient accidentally bolused herself form her insulin pump and was hypoglycemic for an isolated episode. Also hypothermic which improved by the time of transfer to the ICU. Upon arrival to the ICU eventually patient's mentation improved oxygen requirement came down to 2 to 3 L nasal cannula. Received supportive care overnight. This morning on 12/24 troponin found to be uptrending started on heparin drip and consulted cardiology. Started on Unasyn for concern for aspiration pneumonia. Started on diet. At this time patient alert awake oriented x4.   Stable for transfer to floors    Procedures during patient's ICU stay:     None    Current Vitals:     BP (!) 116/49   Pulse 65   Temp 98.1 °F (36.7 °C) (Oral)   Resp 21   Wt 145 lb (65.8 kg)   SpO2 99%       Cultures:     Blood cultures:                 [] None drawn      [x] Negative             []  Positive (Details:  )  Urine Culture:                   [] None drawn      [] Negative             []  Positive (Details:  )  Sputum Culture:               [] None drawn       [] Negative             []  Positive (Details:  )   Endotracheal aspirate:     [] None drawn       [] Negative             []  Positive (Details:  )       Consults:     1. Cardiology    Assessment:     Patient Active Problem List    Diagnosis Date Noted    NSTEMI (non-ST elevated myocardial infarction) ([facility] 49/06/7690    Toxic metabolic encephalopathy 94/15/9746    AMS (altered mental status) 12/23/2022       Additional assessment:    NSTEMI  Acute metabolic encephalopathy  DM type II on insulin pump    Recommended Follow-up:     Follow-up on echo result  follow-up on cardiology recommendations  Monitor glucose levels        Above mentioned assessment and plan was discussed by me with the admitting medicine resident. The medicine team assigned to the patient by medicine admitting resident will be following up the patient from now onwards on the floor. Gerardo Lopez MD, M.D.   Internal Medicine PGY3  12/24/2022, 8:59 AM

## 2022-12-24 NOTE — PLAN OF CARE
Problem: Discharge Planning  Goal: Discharge to home or other facility with appropriate resources  Outcome: Progressing  Flowsheets (Taken 12/24/2022 0300)  Discharge to home or other facility with appropriate resources: Identify barriers to discharge with patient and caregiver     Problem: Pain  Goal: Verbalizes/displays adequate comfort level or baseline comfort level  Outcome: Progressing     Problem: Respiratory - Adult  Goal: Achieves optimal ventilation and oxygenation  Outcome: Progressing     Problem: Cardiovascular - Adult  Goal: Maintains optimal cardiac output and hemodynamic stability  Outcome: Progressing  Goal: Absence of cardiac dysrhythmias or at baseline  Outcome: Progressing     Problem: Skin/Tissue Integrity - Adult  Goal: Skin integrity remains intact  Outcome: Progressing  Goal: Incisions, wounds, or drain sites healing without S/S of infection  Outcome: Progressing  Goal: Oral mucous membranes remain intact  Outcome: Progressing     Problem: Musculoskeletal - Adult  Goal: Return mobility to safest level of function  Outcome: Progressing  Goal: Maintain proper alignment of affected body part  Outcome: Progressing  Goal: Return ADL status to a safe level of function  Outcome: Progressing     Problem: Infection - Adult  Goal: Absence of infection at discharge  Outcome: Progressing  Goal: Absence of infection during hospitalization  Outcome: Progressing  Goal: Absence of fever/infection during anticipated neutropenic period  Outcome: Progressing     Problem: Hematologic - Adult  Goal: Maintains hematologic stability  Outcome: Progressing

## 2022-12-24 NOTE — ED PROVIDER NOTES
[facility] ED     Emergency Department     Faculty Attestation        I performed a history and physical examination of the patient and discussed management with the resident. I reviewed the residents note and agree with the documented findings and plan of care. Any areas of disagreement are noted on the chart. I was personally present for the key portions of any procedures. I have documented in the chart those procedures where I was not present during the key portions. I have reviewed the emergency nurses triage note. I agree with the chief complaint, past medical history, past surgical history, allergies, medications, social and family history as documented unless otherwise noted below. For mid-level providers such as nurse practitioners as well as physicians assistants:    I have personally seen and evaluated the patient. I find the patient's history and physical exam are consistent with NP/PA documentation. I agree with the care provided, treatment rendered, disposition, & follow-up plan. Additional findings are as noted. Vital Signs: BP (!) 182/92   Pulse (!) 115   Resp 26   SpO2 97%   PCP:  No primary care provider on file. Pertinent Comments:     Patient presents to the emergency department for evaluation of altered mentation she was reportedly found at home unresponsive when was given Narcan had partial response. Family was there but reportedly for EMS were for stories and were unaware of patient's status. She is confused but has a nonfocal neurological exam.  Obtain broad laboratory work-up urinalysis EKG chest x-ray CT imaging of brain, anticipated admission.       Critical Care  None          Josse Henriquez MD    Attending Emergency Medicine Physician            Rob Byrd MD  12/23/22 5875

## 2022-12-24 NOTE — ED PROVIDER NOTES
[address] [facility]  ED  Emergency Department Encounter  Emergency Medicine Resident     Pt Name: Blessing De Dios  MRN: [id number]  Armstrongflissette 1/1/1879  Date of evaluation: 12/23/22  PCP:  No primary care provider on file. CHIEF COMPLAINT       Chief Complaint   Patient presents with    Altered Mental Status       HISTORY OFPRESENT ILLNESS  (Location/Symptom, Timing/Onset, Context/Setting, Quality, Duration, Modifying Phil Asters.)      Zak Bradley is a 81 y.o. female who presents with altered mental status. Patient was found unconscious at home for an unknown amount of time by her family member. EMS arrived and administered 6 mg of Narcan with good response. Patient's family poor historians and did not provide much of medical history. Patient arrived with no identifying information, therefore was given an ileus. Patient obtunded on arrival with 10 L nonrebreather mask in place to maintain oxygen saturations. Patient's eyes open to voice, she follows commands, however she is grunting verbal responses that are incomprehensible. Unable to obtain a full medical history, however patient does have an insulin pump in place to her abdomen. PAST MEDICAL / SURGICAL / SOCIAL / FAMILY HISTORY      has no past medical history on file. has no past surgical history on file. Social:      Family Hx: No family history on file. Allergies:  Patient has no allergy information on record.     Home Medications:  Prior to Admission medications    Not on File       REVIEW OFSYSTEMS    (2-9 systems for level 4, 10 or more for level 5)      Review of Systems   Unable to perform ROS: Mental status change     PHYSICAL EXAM   (up to 7 for level 4, 8 or more forlevel 5)      INITIAL VITALS:   Vitals:    12/24/22 0100   BP: 126/77   Pulse: (!) 103   Resp: 25   Temp:    SpO2: 99%    /77   Pulse (!) 103   Temp (!) 94 °F (34.4 °C)   Resp 25   Wt 145 lb (65.8 kg)   SpO2 99%       Physical Exam  Vitals and nursing note reviewed. HENT:      Head: Normocephalic and atraumatic. Eyes:      Extraocular Movements: Extraocular movements intact. Cardiovascular:      Rate and Rhythm: Regular rhythm. Tachycardia present. Heart sounds: Normal heart sounds. Pulmonary:      Effort: Tachypnea present. Breath sounds: Examination of the right-middle field reveals rhonchi. Examination of the right-lower field reveals rhonchi. Rhonchi present. Comments: Tachypnea; hypoxia  Abdominal:      General: There is no distension. Palpations: Abdomen is soft. Tenderness: There is no abdominal tenderness. Comments: Insulin pump in place to abdomen   Musculoskeletal:      Cervical back: Normal range of motion. Skin:     Comments: cool   Neurological:      Mental Status: She is alert. She is disoriented and confused. GCS: GCS eye subscore is 3. GCS verbal subscore is 2. GCS motor subscore is 6. Cranial Nerves: No cranial nerve deficit or dysarthria. Sensory: No sensory deficit. Motor: No weakness. MEDICAL DECISION MAKING     Initial MDM/Plan: 81 y.o. female who presents with altered mental status, concern for opioid overdose at home as the patient responded to 6 mg of Narcan by EMS. Patient arrives requiring 10 L nonrebreather mask to maintain saturations. She opens eyes to verbal, does follow commands but gives incomprehensible verbal responses. Is shivering. Right-sided rales to lung bases. Tachycardic. Unable to obtain a oral temperature due to patient refusing to close mouth so rectal temperature was obtained about 3 hours into the patient's ER visit showing hypothermia 94 degrees. Patient placed on Javier hugger. Given IV fluids due to tachycardia. Septic work-up obtained due to tachycardia and altered mental status with no evidence of septic source. Blood glucose by EMS 75, blood glucose with us in the 90s.   However after BMP resulted, glucose on BMP noted to be 34. Patient then given an amp of dextrose and her insulin pump was removed from her abdomen due to concern for unintentional bolusing. CT head unremarkable, chest x-ray unremarkable. Suspect patient's obtunded secondary to opioid overdose and hypercapnia. Concern for aspiration given right-sided lung rales. Since she is hypothermic and tachycardic, will start patient on vancomycin and Zosyn and plan to admit to ICU since she is high risk for intubation due to her mental status and hypoxia on room air. Patient's family never called the ER or came to emergency department to check on the patient so patient is still under an alias and we are unable to obtain any previous records of the patient as she is unable to tell us her date of birth or Social Security number.                DIAGNOSTIC RESULTS / EMERGENCYDEPARTMENT COURSE / MDM     LABS:  Labs Reviewed   BASIC METABOLIC PANEL - Abnormal; Notable for the following components:       Result Value    Glucose 36 (*)     BUN 29 (*)     Est, Glom Filt Rate 60 (*)     All other components within normal limits   CBC WITH AUTO DIFFERENTIAL - Abnormal; Notable for the following components:    RDW 15.1 (*)     Seg Neutrophils 76 (*)     Lymphocytes 12 (*)     Segs Absolute 8.28 (*)     All other components within normal limits   HEPATIC FUNCTION PANEL - Abnormal; Notable for the following components:    Alkaline Phosphatase 152 (*)     Total Bilirubin 0.2 (*)     All other components within normal limits   TROPONIN - Abnormal; Notable for the following components:    Troponin, High Sensitivity 21 (*)     All other components within normal limits   BLOOD GAS, VENOUS - Abnormal; Notable for the following components:    pH, Kieran 7.296 (*)     pCO2, Kieran 59.8 (*)     pO2, Kieran 57.9 (*)     O2 Sat, Kieran 89.5 (*)     Carboxyhemoglobin 8.0 (*)     All other components within normal limits   URINALYSIS WITH MICROSCOPIC - Abnormal; Notable for the following components: Glucose, Ur TRACE (*)     Protein, UA 2+ (*)     Bacteria, UA FEW (*)     All other components within normal limits   POCT GLUCOSE - Normal   POCT GLUCOSE - Normal   CULTURE, BLOOD 1   CULTURE, BLOOD 2   COVID-19, RAPID   RESPIRATORY PANEL, MOLECULAR, WITH COVID-19   ETHANOL   LACTIC ACID   LIPASE   MAGNESIUM   BRAIN NATRIURETIC PEPTIDE   BETA-HYDROXYBUTYRATE   IMMATURE PLATELET FRACTION   URINE DRUG SCREEN   TSH WITH REFLEX   CK   CORTISOL TOTAL   MYOGLOBIN, BLOOD   POC GLUCOSE FINGERSTICK   POC GLUCOSE FINGERSTICK   POC GLUCOSE FINGERSTICK     RADIOLOGY:  CT HEAD WO CONTRAST    Result Date: 12/23/2022  EXAMINATION: CT OF THE HEAD WITHOUT CONTRAST  12/23/2022 8:53 pm TECHNIQUE: CT of the head was performed without the administration of intravenous contrast. Automated exposure control, iterative reconstruction, and/or weight based adjustment of the mA/kV was utilized to reduce the radiation dose to as low as reasonably achievable. COMPARISON: None. HISTORY: ORDERING SYSTEM PROVIDED HISTORY: AMS TECHNOLOGIST PROVIDED HISTORY: AMS Decision Support Exception - unselect if not a suspected or confirmed emergency medical condition->Emergency Medical Condition (MA) Reason for Exam: ams FINDINGS: BRAIN/VENTRICLES: There is no acute intracranial hemorrhage, mass effect or midline shift. No abnormal extra-axial fluid collection. The gray-white differentiation is maintained without evidence of an acute infarct. There is no evidence of hydrocephalus. ORBITS: The visualized portion of the orbits demonstrate no acute abnormality. SINUSES: Status post right canal wall down mastoidectomy. The visualized paranasal sinuses and mastoid air cells demonstrate no acute abnormality. SOFT TISSUES/SKULL:  No acute abnormality of the visualized skull or soft tissues. No acute intracranial abnormality. No acute territorial infarction, intracranial hemorrhage or mass lesion.  Status post right canal wall down mastoidectomy with soft tissue density within the mastoidectomy bowl. XR CHEST PORTABLE    Result Date: 12/23/2022  EXAMINATION: ONE XRAY VIEW OF THE CHEST 12/23/2022 8:59 pm COMPARISON: None. HISTORY: ORDERING SYSTEM PROVIDED HISTORY: ams TECHNOLOGIST PROVIDED HISTORY: ams Reason for Exam: AMS; pt very agitated and uncooperative with exam best possible image at this time. -JEK FINDINGS: The cardiomediastinal silhouette is of normal size. There are increased lung markings bilaterally, may be related to mild pulmonary vascular congestion versus bronchitis. There is no pleural effusion. There is no pneumothorax. There is no acute osseous abnormality. Increased lung markings bilaterally, may be related to mild pulmonary vascular congestion versus bronchitis. EMERGENCY DEPARTMENT COURSE:  ED Course as of 12/24/22 0129   Fri Dec 23, 2022   2156 Glucose, Random(!!): 36 [JT]   2329 Repeat BG 88 [JT]      ED Course User Index  [JT] Tony Melendez MD        PROCEDURES:  None    CONSULTS:  IP CONSULT TO CRITICAL CARE      FINAL IMPRESSION      1. Altered mental status, unspecified altered mental status type    2. Hypoglycemia    3. Opioid overdose, undetermined intent, [facility] ([facility]        [address] / [address] Admitted 12/23/2022 11:43:53 PM      PATIENT REFERRED TO:  No follow-up provider specified.     DISCHARGE MEDICATIONS:  New Prescriptions    No medications on file       Tony Melendez MD  Emergency Medicine Resident    (Please note that portions of this note were completed with a voice recognition program.Efforts were made to edit the dictations but occasionally words are mis-transcribed.)       Tony Melendez MD  Resident  12/24/22 4684

## 2022-12-24 NOTE — PLAN OF CARE
Problem: Discharge Planning  Goal: Discharge to home or other facility with appropriate resources  12/24/2022 1128 by Taina Giraldo  Outcome: Progressing  12/24/2022 0438 by Zaki Dotson RN  Outcome: Progressing  Flowsheets (Taken 12/24/2022 0300)  Discharge to home or other facility with appropriate resources: Identify barriers to discharge with patient and caregiver     Problem: Pain  Goal: Verbalizes/displays adequate comfort level or baseline comfort level  12/24/2022 1128 by Tania Giraldo  Outcome: Progressing  12/24/2022 0438 by Zaki Dotson RN  Outcome: Progressing     Problem: Respiratory - Adult  Goal: Achieves optimal ventilation and oxygenation  12/24/2022 1128 by Taina Giraldo  Outcome: Progressing  12/24/2022 0438 by Zaki Dotson RN  Outcome: Progressing     Problem: Cardiovascular - Adult  Goal: Maintains optimal cardiac output and hemodynamic stability  12/24/2022 1128 by Taina Giraldo  Outcome: Progressing  12/24/2022 0438 by Zaki Dotson RN  Outcome: Progressing  Goal: Absence of cardiac dysrhythmias or at baseline  12/24/2022 1128 by Taina Giraldo  Outcome: Progressing  12/24/2022 0438 by Zaki Dotson RN  Outcome: Progressing     Problem: Skin/Tissue Integrity - Adult  Goal: Skin integrity remains intact  Recent Flowsheet Documentation  Taken 12/24/2022 0451 by Zaki Dotson RN  Skin Integrity Remains Intact: Monitor for areas of redness and/or skin breakdown  12/24/2022 0438 by Zaki Dotson RN  Outcome: Progressing  Goal: Incisions, wounds, or drain sites healing without S/S of infection  12/24/2022 1128 by Taina Giraldo  Outcome: Progressing  12/24/2022 0438 by Zaki Dotson RN  Outcome: Progressing  Goal: Oral mucous membranes remain intact  12/24/2022 1128 by Taina Giraldo  Outcome: Progressing  12/24/2022 0438 by Zaki Dotson RN  Outcome: Progressing     Problem: Musculoskeletal - Adult  Goal: Return mobility to safest level of function  12/24/2022 1128 by Salvatore Mclaughlin  Outcome: Progressing  12/24/2022 0438 by Yina Montaño RN  Outcome: Progressing  Goal: Maintain proper alignment of affected body part  12/24/2022 1128 by Salvatore Mclaughlin  Outcome: Progressing  12/24/2022 0438 by Yina Montaño RN  Outcome: Progressing  Goal: Return ADL status to a safe level of function  12/24/2022 1128 by Salvatore Mclaughlin  Outcome: Progressing  12/24/2022 0438 by Yina Montaño RN  Outcome: Progressing     Problem: Infection - Adult  Goal: Absence of infection at discharge  12/24/2022 1128 by Salvatore Mclaughlin  Outcome: Progressing  12/24/2022 0438 by Yina Montaño RN  Outcome: Progressing  Goal: Absence of infection during hospitalization  12/24/2022 1128 by Salvatore Mclaughlin  Outcome: Progressing  12/24/2022 0438 by Yina Montaño RN  Outcome: Progressing  Goal: Absence of fever/infection during anticipated neutropenic period  12/24/2022 1128 by Salvatore Mclaughlin  Outcome: Progressing  12/24/2022 0438 by Yina Montaño RN  Outcome: Progressing     Problem: Hematologic - Adult  Goal: Maintains hematologic stability  12/24/2022 1128 by Salvatore Mclaughlin  Outcome: Progressing  12/24/2022 0438 by Yina Montaño RN  Outcome: Progressing     Problem: Skin/Tissue Integrity  Goal: Absence of new skin breakdown  Description: 1. Monitor for areas of redness and/or skin breakdown  2. Assess vascular access sites hourly  3. Every 4-6 hours minimum:  Change oxygen saturation probe site  4. Every 4-6 hours:  If on nasal continuous positive airway pressure, respiratory therapy assess nares and determine need for appliance change or resting period.   Outcome: Progressing

## 2022-12-24 NOTE — ED PROVIDER NOTES
Faculty Sign-Out Attestation  Handoff taken on the following patient from prior Attending Physician: Cesar Mcgee    I was available and discussed any additional care issues that arose and coordinated the management plans with the resident(s) caring for the patient during my duty period. Any areas of disagreement with residents documentation of care or procedures are noted on the chart. I was personally present for the key portions of any/all procedures during my duty period. I have documented in the chart those procedures where I was not present during the key portions.     ICU, altered mentation, insulin pump, hypoglycemic, found down, septic labs, abx,   >>> admit,     Mario Alberto Fitch,   Attending Physician       Mario Alberto Fitch,   12/23/22 2483

## 2022-12-25 PROBLEM — J69.0 ASPIRATION PNEUMONIA DUE TO GASTRIC SECRETIONS (HCC): Status: ACTIVE | Noted: 2022-12-25

## 2022-12-25 PROBLEM — E16.2 HYPOGLYCEMIA: Status: ACTIVE | Noted: 2022-12-25

## 2022-12-25 PROBLEM — G93.41 ACUTE METABOLIC ENCEPHALOPATHY: Status: ACTIVE | Noted: 2022-12-24

## 2022-12-25 LAB
ABSOLUTE EOS #: 0.11 K/UL (ref 0–0.44)
ABSOLUTE IMMATURE GRANULOCYTE: 0.03 K/UL (ref 0–0.3)
ABSOLUTE LYMPH #: 1.61 K/UL (ref 1.1–3.7)
ABSOLUTE MONO #: 1.13 K/UL (ref 0.1–1.2)
ANION GAP SERPL CALCULATED.3IONS-SCNC: 9 MMOL/L (ref 9–17)
BASOPHILS # BLD: 0 % (ref 0–2)
BASOPHILS ABSOLUTE: 0.03 K/UL (ref 0–0.2)
BUN BLDV-MCNC: 29 MG/DL (ref 8–23)
CALCIUM SERPL-MCNC: 8.4 MG/DL (ref 8.6–10.4)
CHLORIDE BLD-SCNC: 100 MMOL/L (ref 98–107)
CO2: 24 MMOL/L (ref 20–31)
CORTISOL: 5.6 UG/DL (ref 2.7–18.4)
CREAT SERPL-MCNC: 0.72 MG/DL (ref 0.5–0.9)
EOSINOPHILS RELATIVE PERCENT: 1 % (ref 1–4)
GFR SERPL CREATININE-BSD FRML MDRD: >60 ML/MIN/1.73M2
GLUCOSE BLD-MCNC: 176 MG/DL (ref 65–105)
GLUCOSE BLD-MCNC: 225 MG/DL (ref 70–99)
GLUCOSE BLD-MCNC: 251 MG/DL (ref 65–105)
GLUCOSE BLD-MCNC: 268 MG/DL (ref 65–105)
GLUCOSE BLD-MCNC: 275 MG/DL (ref 65–105)
GLUCOSE BLD-MCNC: 280 MG/DL (ref 65–105)
GLUCOSE BLD-MCNC: 295 MG/DL (ref 65–105)
HCT VFR BLD CALC: 37.8 % (ref 36.3–47.1)
HEMOGLOBIN: 11.1 G/DL (ref 11.9–15.1)
IMMATURE GRANULOCYTES: 0 %
LYMPHOCYTES # BLD: 19 % (ref 24–43)
MCH RBC QN AUTO: 30 PG (ref 25.2–33.5)
MCHC RBC AUTO-ENTMCNC: 29.4 G/DL (ref 28.4–34.8)
MCV RBC AUTO: 102.2 FL (ref 82.6–102.9)
MONOCYTES # BLD: 14 % (ref 3–12)
NRBC AUTOMATED: 0 PER 100 WBC
PARTIAL THROMBOPLASTIN TIME: 46.2 SEC (ref 20.5–30.5)
PARTIAL THROMBOPLASTIN TIME: 58.2 SEC (ref 20.5–30.5)
PARTIAL THROMBOPLASTIN TIME: 60 SEC (ref 20.5–30.5)
PDW BLD-RTO: 14.6 % (ref 11.8–14.4)
PLATELET # BLD: ABNORMAL K/UL (ref 138–453)
PLATELET, FLUORESCENCE: 117 K/UL (ref 138–453)
PLATELET, IMMATURE FRACTION: 8.2 % (ref 1.1–10.3)
POTASSIUM SERPL-SCNC: 4.4 MMOL/L (ref 3.7–5.3)
RBC # BLD: 3.7 M/UL (ref 3.95–5.11)
RBC # BLD: ABNORMAL 10*6/UL
SEG NEUTROPHILS: 65 % (ref 36–65)
SEGMENTED NEUTROPHILS ABSOLUTE COUNT: 5.38 K/UL (ref 1.5–8.1)
SODIUM BLD-SCNC: 133 MMOL/L (ref 135–144)
TROPONIN, HIGH SENSITIVITY: 111 NG/L (ref 0–14)
TROPONIN, HIGH SENSITIVITY: 81 NG/L (ref 0–14)
TROPONIN, HIGH SENSITIVITY: 82 NG/L (ref 0–14)
WBC # BLD: 8.3 K/UL (ref 3.5–11.3)

## 2022-12-25 PROCEDURE — 6360000002 HC RX W HCPCS: Performed by: INTERNAL MEDICINE

## 2022-12-25 PROCEDURE — 94640 AIRWAY INHALATION TREATMENT: CPT

## 2022-12-25 PROCEDURE — 51798 US URINE CAPACITY MEASURE: CPT

## 2022-12-25 PROCEDURE — 6360000002 HC RX W HCPCS

## 2022-12-25 PROCEDURE — 6370000000 HC RX 637 (ALT 250 FOR IP)

## 2022-12-25 PROCEDURE — 85055 RETICULATED PLATELET ASSAY: CPT

## 2022-12-25 PROCEDURE — 97162 PT EVAL MOD COMPLEX 30 MIN: CPT

## 2022-12-25 PROCEDURE — 97110 THERAPEUTIC EXERCISES: CPT

## 2022-12-25 PROCEDURE — 1200000000 HC SEMI PRIVATE

## 2022-12-25 PROCEDURE — 2700000000 HC OXYGEN THERAPY PER DAY

## 2022-12-25 PROCEDURE — 85730 THROMBOPLASTIN TIME PARTIAL: CPT

## 2022-12-25 PROCEDURE — 6370000000 HC RX 637 (ALT 250 FOR IP): Performed by: INTERNAL MEDICINE

## 2022-12-25 PROCEDURE — 99223 1ST HOSP IP/OBS HIGH 75: CPT | Performed by: INTERNAL MEDICINE

## 2022-12-25 PROCEDURE — 84484 ASSAY OF TROPONIN QUANT: CPT

## 2022-12-25 PROCEDURE — 82533 TOTAL CORTISOL: CPT

## 2022-12-25 PROCEDURE — 2580000003 HC RX 258

## 2022-12-25 PROCEDURE — 36415 COLL VENOUS BLD VENIPUNCTURE: CPT

## 2022-12-25 PROCEDURE — 85025 COMPLETE CBC W/AUTO DIFF WBC: CPT

## 2022-12-25 PROCEDURE — 99233 SBSQ HOSP IP/OBS HIGH 50: CPT | Performed by: INTERNAL MEDICINE

## 2022-12-25 PROCEDURE — 80048 BASIC METABOLIC PNL TOTAL CA: CPT

## 2022-12-25 PROCEDURE — 82947 ASSAY GLUCOSE BLOOD QUANT: CPT

## 2022-12-25 PROCEDURE — 94761 N-INVAS EAR/PLS OXIMETRY MLT: CPT

## 2022-12-25 PROCEDURE — 97530 THERAPEUTIC ACTIVITIES: CPT

## 2022-12-25 PROCEDURE — 6370000000 HC RX 637 (ALT 250 FOR IP): Performed by: STUDENT IN AN ORGANIZED HEALTH CARE EDUCATION/TRAINING PROGRAM

## 2022-12-25 RX ORDER — PREDNISONE 20 MG/1
40 TABLET ORAL DAILY
Status: DISCONTINUED | OUTPATIENT
Start: 2022-12-25 | End: 2022-12-26

## 2022-12-25 RX ORDER — BUDESONIDE AND FORMOTEROL FUMARATE DIHYDRATE 160; 4.5 UG/1; UG/1
2 AEROSOL RESPIRATORY (INHALATION) 2 TIMES DAILY
Status: DISCONTINUED | OUTPATIENT
Start: 2022-12-25 | End: 2022-12-27 | Stop reason: HOSPADM

## 2022-12-25 RX ORDER — IPRATROPIUM BROMIDE AND ALBUTEROL SULFATE 2.5; .5 MG/3ML; MG/3ML
1 SOLUTION RESPIRATORY (INHALATION) 4 TIMES DAILY
Status: DISCONTINUED | OUTPATIENT
Start: 2022-12-25 | End: 2022-12-27 | Stop reason: HOSPADM

## 2022-12-25 RX ADMIN — ACETAMINOPHEN 650 MG: 325 TABLET ORAL at 00:13

## 2022-12-25 RX ADMIN — IPRATROPIUM BROMIDE AND ALBUTEROL SULFATE 1 AMPULE: .5; 3 SOLUTION RESPIRATORY (INHALATION) at 15:52

## 2022-12-25 RX ADMIN — AMPICILLIN SODIUM AND SULBACTAM SODIUM 3000 MG: 2; 1 INJECTION, POWDER, FOR SOLUTION INTRAMUSCULAR; INTRAVENOUS at 11:36

## 2022-12-25 RX ADMIN — AMPICILLIN SODIUM AND SULBACTAM SODIUM 3000 MG: 2; 1 INJECTION, POWDER, FOR SOLUTION INTRAMUSCULAR; INTRAVENOUS at 20:30

## 2022-12-25 RX ADMIN — ASPIRIN 81 MG: 81 TABLET, CHEWABLE ORAL at 11:23

## 2022-12-25 RX ADMIN — IPRATROPIUM BROMIDE AND ALBUTEROL SULFATE 1 AMPULE: .5; 3 SOLUTION RESPIRATORY (INHALATION) at 12:50

## 2022-12-25 RX ADMIN — HEPARIN SODIUM 16 UNITS/KG/HR: 10000 INJECTION, SOLUTION INTRAVENOUS at 11:25

## 2022-12-25 RX ADMIN — HEPARIN SODIUM 1970 UNITS: 1000 INJECTION INTRAVENOUS; SUBCUTANEOUS at 06:01

## 2022-12-25 RX ADMIN — INSULIN LISPRO 4 UNITS: 100 INJECTION, SOLUTION INTRAVENOUS; SUBCUTANEOUS at 17:55

## 2022-12-25 RX ADMIN — AMPICILLIN SODIUM AND SULBACTAM SODIUM 3000 MG: 2; 1 INJECTION, POWDER, FOR SOLUTION INTRAMUSCULAR; INTRAVENOUS at 05:15

## 2022-12-25 RX ADMIN — PREDNISONE 40 MG: 20 TABLET ORAL at 16:12

## 2022-12-25 RX ADMIN — Medication 500 MG: at 16:42

## 2022-12-25 RX ADMIN — INSULIN LISPRO 4 UNITS: 100 INJECTION, SOLUTION INTRAVENOUS; SUBCUTANEOUS at 12:46

## 2022-12-25 RX ADMIN — DESMOPRESSIN ACETATE 40 MG: 0.2 TABLET ORAL at 20:18

## 2022-12-25 ASSESSMENT — ENCOUNTER SYMPTOMS
SORE THROAT: 0
ALLERGIC/IMMUNOLOGIC NEGATIVE: 1
TROUBLE SWALLOWING: 0
EYE REDNESS: 0
PHOTOPHOBIA: 0
COUGH: 1
ABDOMINAL PAIN: 0
VOMITING: 0
SHORTNESS OF BREATH: 1
WHEEZING: 1
DIARRHEA: 0

## 2022-12-25 ASSESSMENT — PAIN SCALES - GENERAL: PAINLEVEL_OUTOF10: 5

## 2022-12-25 NOTE — PLAN OF CARE
Problem: Respiratory - Adult  Goal: Achieves optimal ventilation and oxygenation  12/25/2022 1254 by Gaston Hull RCP  Outcome: Progressing   BRONCHOSPASM/BRONCHOCONSTRICTION     [x]         IMPROVE AERATION/BREATH SOUNDS  [x]   ADMINISTER BRONCHODILATOR THERAPY AS APPROPRIATE  [x]   ASSESS BREATH SOUNDS  []   IMPLEMENT AEROSOL/MDI PROTOCOL  [x]   PATIENT EDUCATION AS NEEDED   PROVIDE ADEQUATE OXYGENATION WITH ACCEPTABLE SP02/ABG'S    [x]  IDENTIFY APPROPRIATE OXYGEN THERAPY  [x]   MONITOR SP02/ABG'S AS NEEDED   [x]   PATIENT EDUCATION AS NEEDED

## 2022-12-25 NOTE — PROGRESS NOTES
[facility]  Progress Note                   Date:   12/25/2022  Patient name: Baljeet Vicente  Date of admission:  12/23/2022  8:05 PM  MRN:   [id number]  YOB: 1955  PCP: No primary care provider on file. Reason for Admission: Hypoglycemia [E16.2]  Opioid overdose, undetermined intent, initial encounter ([facility] [T40.2X4A]  Altered mental status, unspecified altered mental status type [R41.82]  AMS (altered mental status) [R41.82]    Subjective:       Clinical Changes /Abnormalities:No acute CV issues/concerns overnight. Labs, vitals, & tele reviewed. Echo pending    Review of Systems    Medications:   Scheduled Meds:   sodium chloride flush  5-40 mL IntraVENous 2 times per day    aspirin  81 mg Oral Daily    atorvastatin  40 mg Oral Nightly    ampicillin-sulbactam  3,000 mg IntraVENous Q6H    insulin lispro  0-8 Units SubCUTAneous TID WC    insulin lispro  0-4 Units SubCUTAneous Nightly     Continuous Infusions:   sodium chloride 10 mL/hr at 12/24/22 1847    heparin (PORCINE) Infusion 14 Units/kg/hr (12/24/22 1847)    dextrose       CBC:   Recent Labs     12/23/22 2119 12/24/22  0525 12/25/22  0455   WBC 10.9 8.6 8.3   HGB 14.3 11.8* 11.1*   PLT See Reflexed IPF Result 144 See Reflexed IPF Result     BMP:    Recent Labs     12/23/22 2119 12/23/22  2321 12/24/22  0525     --  142   K 4.6  --  4.6     --  108*   CO2 27  --  24   BUN 29*  --  27*   CREATININE 0.64  --  0.67   GLUCOSE 36* 88 109*     Hepatic:  Recent Labs     12/23/22 2119 12/24/22  0525   AST 22 25   ALT 19 15   BILITOT 0.2* 0.3   ALKPHOS 152* 123*     Troponin:   Recent Labs     12/24/22  0525 12/24/22  1415 12/24/22  2057   TROPHS 210* 194* 134*     BNP: No results for input(s): BNP in the last 72 hours. Lipids: No results for input(s): CHOL, HDL in the last 72 hours. Invalid input(s): LDLCALCU  INR: No results for input(s): INR in the last 72 hours.     Objective:   Vitals: BP (!) 127/56   Pulse 95   Temp 98.5 °F (36.9 °C) (Oral)   Resp 16   Ht 5' (1.524 m)   Wt 145 lb (65.8 kg)   SpO2 96%   BMI 28.32 kg/m²   General appearance: alert and cooperative with exam  HEENT: Head: Normocephalic, no lesions, without obvious abnormality. Neck:no JVD, trachea midline, no adenopathy  Lungs: Clear to auscultation  Heart: Regular rate and rhythm, s1/s2 auscultated, no murmurs, SR  Abdomen: soft, non-tender, bowel sounds active  Extremities: no edema  Neurologic: not done        Assessment / Acute Cardiac Problems:   NSTEMI - type I vs type II  Toxic metabolic encephalopathy    Patient Active Problem List:     AMS (altered mental status)     NSTEMI (non-ST elevated myocardial infarction) ([facility]     Toxic metabolic encephalopathy      Plan of Treatment:   Stable. Continue IV Heparin gtt. Trop trending down  Echo pending.   Further ischemic work-up pending echo findings  AM labs pending  Keep K >4 and Mg >2    Electronically signed by AMBER San CNP on 12/25/2022 at 5:48 AM  [address]  [phone]

## 2022-12-25 NOTE — PROGRESS NOTES
Physical Therapy  Facility/Department: [facility] ONC/MED SURG  Physical Therapy Initial Assessment    Name: Severiano Moreno  : 1955  MRN: [id number]  Date of Service: 2022    Discharge Recommendations:  Patient would benefit from continued therapy after discharge          Patient Diagnosis(es): The primary encounter diagnosis was Altered mental status, unspecified altered mental status type. Diagnoses of Hypoglycemia and Opioid overdose, undetermined intent, initial encounter [facility]) were also pertinent to this visit. Past Medical History:  has a past medical history of CHF (congestive heart failure) ([facility] [address] COPD (chronic obstructive pulmonary disease) ([facility] [address] DMII (diabetes mellitus, type 2) ([facility] [address] GERD (gastroesophageal reflux disease), HTN (hypertension), and [facility] ([facility] Past Surgical History:  has a past surgical history that includes Eye surgery and Colonoscopy. Assessment   Body Structures, Functions, Activity Limitations Requiring Skilled Therapeutic Intervention: Decreased functional mobility ; Decreased strength;Decreased safe awareness;Decreased cognition;Decreased endurance;Decreased balance  Assessment: Pt presents on day 2 of hospitalization with likely overdose on insulin; continue to follow along medically. Pt also dx w/ metabolic encephalopathy but is demonstrating some improvement. Significant sensory stim and re-orientation provided with good carry over by end of treatment. Will continue to follow while hospitalized.   Specific Instructions for Next Treatment: balance; gait  Therapy Prognosis: Good  Decision Making: Medium Complexity  Clinical Presentation: evovlign  Requires PT Follow-Up: Yes  Activity Tolerance  Activity Tolerance: Treatment limited secondary to decreased cognition     Plan   Physcial Therapy Plan  General Plan: 5-7 times per week  Specific Instructions for Next Treatment: balance; gait  Current Treatment Recommendations: Strengthening, Balance training, Functional mobility training, Gait training, Stair training, Endurance training, Cognitive/Perceptual training, Equipment evaluation, education, & procurement, Home exercise program, Safety education & training, Patient/Caregiver education & training, Therapeutic activities  Safety Devices  Type of Devices: All zak prominences offloaded, All fall risk precautions in place, Call light within reach, Nurse notified     Restrictions  Restrictions/Precautions  Restrictions/Precautions: Up as Tolerated  Position Activity Restriction  Other position/activity restrictions: O2 via n.c. Subjective   General  Chart Reviewed: Yes  Patient assessed for rehabilitation services?: Yes  Response To Previous Treatment: Not applicable  Family / Caregiver Present: No  Follows Commands: Within Functional Limits  Subjective  Subjective: Pt talkative; motivated but continues with slight confusion at times.          Social/Functional History  Social/Functional History  Lives With: Other (comment) (sister and nephew)  Type of Home: House  Home Layout: One level  Home Access: Stairs to enter with rails  Entrance Stairs - Number of Steps: 2  Entrance Stairs - Rails: Both  Bathroom Shower/Tub: Tub/Shower unit  Bathroom Toilet: Standard  Bathroom Equipment: Grab bars in shower  Bathroom Accessibility: Accessible  Receives Help From: Family  ADL Assistance: Needs assistance  Bath: Independent  Dressing: Independent  Grooming: Independent  Feeding: Independent  Toileting: Independent  Homemaking Assistance: Needs assistance  Transfer Assistance: Needs assistance  Active : No  Patient's  Info: Her sisterFátima drives her  Mode of Transportation: [employer]  Occupation: On disability  Type of Occupation: Worked at [employer]  Vision/Hearing  Vision  Vision: Impaired  Vision Exceptions: Wears glasses for reading  Hearing  Hearing: Exceptions to [facility]  Hearing Exceptions: Right hearing aid;Hard of hearing/hearing concerns    Cognition   Orientation  Overall Orientation Status: Impaired  Orientation Level: Oriented to person     Objective   Heart Rate: 79  Heart Rate Source: Monitor  BP: (!) 103/50  BP Location: Left upper arm  BP Method: Automatic  Patient Position: Sitting;Up in chair  MAP (Calculated): 68  Resp: 16  SpO2: 96 %  O2 Device: Nasal cannula     Observation/Palpation  Posture: Fair  Observation: impulsive, motivated but not able to perform activities without making a mess        AROM RLE (degrees)  RLE AROM: WFL  AROM LLE (degrees)  LLE AROM : WFL  AROM RUE (degrees)  RUE AROM : WFL  AROM LUE (degrees)  LUE AROM : WFL  Strength RLE  Strength RLE: WFL  Comment: 4/5 grossly  Strength LLE  Strength LLE: WFL  Comment: 4/5 grossly  Strength RUE  Strength RUE: WFL  Comment: shoulder 4-/5  Strength LUE  Strength LUE: WFL           Bed mobility  Rolling to Left: Independent  Rolling to Right: Independent  Supine to Sit: Independent  Sit to Supine: Independent  Transfers  Sit to Stand: Stand by assistance  Stand to Sit: Stand by assistance  Bed to Chair: min assist   Ambulation  Surface: Level tile  Device: Rolling Walker  Assistance: Minimal assistance  Quality of Gait: gait limited to fatigue; patient assist for line management required. Gait Deviations: Increased IZABELLA;Shuffles  Distance: 45 ft x 1  Comments: Pt mentation limiting function   Mod assist for gait w/o device. Balance  Posture: Good  Sitting - Static: Good  Sitting - Dynamic: Good  Standing - Static: Fair;+  Standing - Dynamic: Fair  Comments: pt requiring r.walker for stability     Sensory stimulation and re-orientaiton education. Pt performed circulation ex's and deep breathing exercises. Pt up to chair at end of treatment and RN informed.      AM-PAC Score 17/24     Goals  Short Term Goals  Time Frame for Short Term Goals: 12  Short Term Goal 1: Transfers independent  Short Term Goal 2: Amb x 150 ft w/ r.walker independently  Short Term Goal 3: Pt oriented x 4 with good awareness of safety with mobility. Short Term Goal 4: Pt able to ascend/ descend 4 steps with one rail  Short Term Goal 5: Pt issued HEP and ind w/ exercises. Patient Goals   Patient Goals : Pt goal is to be safe prior to d/c home. Pt without doesn't remember much about how she got here     Education  Patient Education  Education Given To: Patient  Education Provided: Role of Therapy;Transfer Training; Fall Prevention Strategies  Education Method: Demonstration;Verbal  Barriers to Learning: Cognition  Education Outcome: Continued education needed    Therapy Time   Individual   Time In 8992   Time Out 1520   Minutes 35     Total patient care time 46 minutes. Treatment time 25 minutes.      Arely Saldana, PT

## 2022-12-25 NOTE — H&P
[facility]  Office: [address], DO, Lashay Marvin, DO, Daniele Phlegm, DO, Fabio Laurent Blood, DO, Patricia Elmore MD, Hodan Pearce MD, Patricia Clarke MD, Shasha Ramirez MD,  Kathryn Nicole MD, Carlyle Vance MD, Keeley Hernandez, DO, Kimberly eLon MD,  Marianna Mckinley MD, Humberto Ledesma MD, Bennett Perez, DO, Bandar Ritchie MD, Lizz Berry MD, Gera Cuadra, DO, Cesar Solis MD, Kristel Parsons MD, Mini Crook MD, Leonie Mercado MD, Bob Obrien DO, Gabo Waddell MD, Marck Betancourt MD, Sammy Ewing, CNP,  Shanda Cuellar, CNP, Deirdre Chaudhry, CNP, Yosi Lundberg, CNP,  Rojas Agustin, [facility], Wilmar Koch, CNP, Yolette Parra, CNP, Bambi Mariscal, CNP, David Antonio, CNP, Antony Libman, CNP, Dot Joshi PA-C, Ratna Tony, CNS, Teri Garcia, CNP, Senia Peterson, CNP         [facility]    HISTORY AND PHYSICAL EXAMINATION            Date:   12/25/2022  Patient name:  Verena Krabbe  Date of admission:  12/23/2022  8:05 PM  MRN:   [id number]  Account:  [de-identified]  YOB: 1955  PCP:    No primary care provider on file. Room:   [facility]  Code Status:    Full Code    Chief Complaint:     Chief Complaint   Patient presents with    Altered Mental Status       History Obtained From:     patient, electronic medical record    History of Present Illness:     Verena Krabbe is a 78 y.o. female who presents to our hospital after being found unresponsive. Patient accidentally bloused herself from her insulin pump and was hypoglycemic. She was admitted to the ICU for mangemnt. She was treated for her hypoglycemic episode . She did develop a new oxygen requirment tahat improved. She was starte don Unasyn due to concern for aspiration pneumoina. She was then transferred to the floor for continued medical management. Today, patient feels like she is doing better.  Breathing Is slightly better but she is coughing. She has no chest pain currently. Past Medical History:     Past Medical History:   Diagnosis Date    [facility]     [facility]     DMII (diabetes mellitus, type 2) [facility]     GERD (gastroesophageal reflux disease)     HTN (hypertension)     Opioid overdose ([facility] 2015        Past Surgical History:     Past Surgical History:   Procedure Laterality Date    COLONOSCOPY      EYE SURGERY          Medications Prior to Admission:     Prior to Admission medications    Medication Sig Start Date End Date Taking? Authorizing Provider   Insulin Infusion Pump (INSULIN PUMP [id number]) KIT by Does not apply route   Yes Historical Provider, MD   montelukast (SINGULAIR) 10 MG tablet Take 10 mg by mouth nightly   Yes Historical Provider, MD   Simvastatin (ZOCOR PO) Take 10 mg by mouth daily   Yes Historical Provider, MD   QUEtiapine Fumarate (SEROQUEL PO) Take 50 mg by mouth nightly as needed (Sleep)   Yes Historical Provider, MD   guaiFENesin (MUCINEX) 600 MG extended release tablet Take 1,200 mg by mouth every 12 hours as needed for Congestion   Yes Historical Provider, MD   Cholecalciferol (VITAMIN D3) 1.25 MG (84053 UT) CAPS Take 1 capsule by mouth Twice a Week   Yes Historical Provider, MD   fluticasone-umeclidin-vilant (TRELEGY ELLIPTA) 200-62.5-25 MCG/ACT AEPB inhaler Inhale 1 puff into the lungs every 12 hours   Yes Historical Provider, MD        Allergies:     Bactrim [sulfamethoxazole-trimethoprim] and Lidocaine    Social History:     Tobacco:    has no history on file for tobacco use. Alcohol:      has no history on file for alcohol use. Drug Use:  has no history on file for drug use. Family History:     No family history on file. Review of Systems:     Positive and Negative as described in HPI.     CONSTITUTIONAL:  negative for fevers, chills, sweats, fatigue, weight loss  HEENT:  negative for vision, hearing changes, runny nose, throat pain  RESPIRATORY:  negative for shortness of breath, cough, congestion, wheezing  CARDIOVASCULAR:  negative for chest pain, palpitations  GASTROINTESTINAL:  negative for nausea, vomiting, diarrhea, constipation, change in bowel habits, abdominal pain   GENITOURINARY:  negative for difficulty of urination, burning with urination, frequency   INTEGUMENT:  negative for rash, skin lesions, easy bruising   HEMATOLOGIC/LYMPHATIC:  negative for swelling/edema   ALLERGIC/IMMUNOLOGIC:  negative for urticaria , itching  ENDOCRINE:  negative increase in drinking, increase in urination, hot or cold intolerance  MUSCULOSKELETAL:  negative joint pains, muscle aches, swelling of joints  NEUROLOGICAL:  negative for headaches, dizziness, lightheadedness, numbness, pain, tingling extremities  BEHAVIOR/PSYCH:  negative for depression, anxiety    Physical Exam:   BP (!) 103/50   Pulse 79   Temp 98.1 °F (36.7 °C) (Oral)   Resp 16   Ht 5' (1.524 m)   Wt 145 lb (65.8 kg)   SpO2 96%   BMI 28.32 kg/m²   Temp (24hrs), Av.4 °F (36.9 °C), Min:97.9 °F (36.6 °C), Max:98.8 °F (37.1 °C)    Recent Labs     22  2036 22  0009 22  0808 22  1144   POCGLU 260* 268* 176* 280*       Intake/Output Summary (Last 24 hours) at 2022 1605  Last data filed at 2022 1847  Gross per 24 hour   Intake 162.93 ml   Output 125 ml   Net 37.93 ml       General Appearance: alert, well appearing, and in no acute distress  Mental status: oriented to person, place, and time  Head: normocephalic, atraumatic  Eye: no icterus, redness, pupils equal and reactive, extraocular eye movements intact, conjunctiva clear  Ear: normal external ear, no discharge, hearing intact  Nose: no drainage noted  Mouth: mucous membranes moist  Neck: supple, no carotid bruits, thyroid not palpable  Lungs: decreased air entry, with diffuses wheezing otherwise clear to ausculation, no, rales or rhonchi, normal effort +cough  Cardiovascular: normal rate, regular rhythm, no murmur, gallop, rub  Abdomen: Soft, nontender, nondistended, normal bowel sounds, no hepatomegaly or splenomegaly  Neurologic: There are no new focal motor or sensory deficits, normal muscle tone and bulk, no abnormal sensation, normal speech, cranial nerves II through XII grossly intact  Skin: No gross lesions, rashes, bruising or bleeding on exposed skin area  Extremities: peripheral pulses palpable, no pedal edema or calf pain with palpation  Psych: normal affect    Investigations:      Laboratory Testing:  Recent Results (from the past 24 hour(s))   POC Glucose Fingerstick    Collection Time: 12/24/22  4:28 PM   Result Value Ref Range    POC Glucose 248 (H) 65 - 105 mg/dL   POC Glucose Fingerstick    Collection Time: 12/24/22  7:56 PM   Result Value Ref Range    POC Glucose 276 (H) 65 - 105 mg/dL   POC Glucose Fingerstick    Collection Time: 12/24/22  8:36 PM   Result Value Ref Range    POC Glucose 260 (H) 65 - 105 mg/dL   APTT    Collection Time: 12/24/22  8:57 PM   Result Value Ref Range    PTT 55.2 (H) 20.5 - 30.5 sec   Troponin    Collection Time: 12/24/22  8:57 PM   Result Value Ref Range    Troponin, High Sensitivity 134 (HH) 0 - 14 ng/L   POC Glucose Fingerstick    Collection Time: 12/25/22 12:09 AM   Result Value Ref Range    POC Glucose 268 (H) 65 - 105 mg/dL   CBC with Auto Differential    Collection Time: 12/25/22  4:55 AM   Result Value Ref Range    WBC 8.3 3.5 - 11.3 k/uL    RBC 3.70 (L) 3.95 - 5.11 m/uL    Hemoglobin 11.1 (L) 11.9 - 15.1 g/dL    Hematocrit 37.8 36.3 - 47.1 %    .2 82.6 - 102.9 fL    MCH 30.0 25.2 - 33.5 pg    MCHC 29.4 28.4 - 34.8 g/dL    RDW 14.6 (H) 11.8 - 14.4 %    Platelets See Reflexed IPF Result 138 - 453 k/uL    NRBC Automated 0.0 0.0 per 100 WBC    RBC Morphology ANISOCYTOSIS PRESENT     Seg Neutrophils 65 36 - 65 %    Lymphocytes 19 (L) 24 - 43 %    Monocytes 14 (H) 3 - 12 %    Eosinophils % 1 1 - 4 % Basophils 0 0 - 2 %    Immature Granulocytes 0 0 %    Segs Absolute 5.38 1.50 - 8.10 k/uL    Absolute Lymph # 1.61 1.10 - 3.70 k/uL    Absolute Mono # 1.13 0.10 - 1.20 k/uL    Absolute Eos # 0.11 0.00 - 0.44 k/uL    Basophils Absolute 0.03 0.00 - 0.20 k/uL    Absolute Immature Granulocyte 0.03 0.00 - 0.30 k/uL   Basic Metabolic Panel w/ Reflex to MG    Collection Time: 12/25/22  4:55 AM   Result Value Ref Range    Glucose 225 (H) 70 - 99 mg/dL    BUN 29 (H) 8 - 23 mg/dL    Creatinine 0.72 0.50 - 0.90 mg/dL    Est, Glom Filt Rate >60 >60 mL/min/1.73m2    Calcium 8.4 (L) 8.6 - 10.4 mg/dL    Sodium 133 (L) 135 - 144 mmol/L    Potassium 4.4 3.7 - 5.3 mmol/L    Chloride 100 98 - 107 mmol/L    CO2 24 20 - 31 mmol/L    Anion Gap 9 9 - 17 mmol/L   APTT    Collection Time: 12/25/22  4:55 AM   Result Value Ref Range    PTT 46.2 (H) 20.5 - 30.5 sec   Troponin    Collection Time: 12/25/22  4:55 AM   Result Value Ref Range    Troponin, High Sensitivity 111 (HH) 0 - 14 ng/L   Immature Platelet Fraction    Collection Time: 12/25/22  4:55 AM   Result Value Ref Range    Platelet, Immature Fraction 8.2 1.1 - 10.3 %    Platelet, Fluorescence 117 (L) 138 - 453 k/uL   POC Glucose Fingerstick    Collection Time: 12/25/22  8:08 AM   Result Value Ref Range    POC Glucose 176 (H) 65 - 105 mg/dL   POC Glucose Fingerstick    Collection Time: 12/25/22 11:44 AM   Result Value Ref Range    POC Glucose 280 (H) 65 - 105 mg/dL   Troponin    Collection Time: 12/25/22 12:31 PM   Result Value Ref Range    Troponin, High Sensitivity 81 (HH) 0 - 14 ng/L   APTT    Collection Time: 12/25/22 12:56 PM   Result Value Ref Range    PTT 60.0 (H) 20.5 - 30.5 sec       Imaging/Diagnostics:  CT HEAD WO CONTRAST    Result Date: 12/23/2022  No acute intracranial abnormality. No acute territorial infarction, intracranial hemorrhage or mass lesion. Status post right canal wall down mastoidectomy with soft tissue density within the mastoidectomy bowl.      XR CHEST PORTABLE    Result Date: 12/24/2022  Perihilar consolidations with interval worsening. XR CHEST PORTABLE    Result Date: 12/23/2022  Increased lung markings bilaterally, may be related to mild pulmonary vascular congestion versus bronchitis. Assessment :      Hospital Problems             Last Modified POA    * (Principal) Toxic metabolic encephalopathy 22/86/3560 Yes    AMS (altered mental status) 12/24/2022 Yes    NSTEMI (non-ST elevated myocardial infarction) ([facility] [address] 12/24/2022 Yes       Plan:     Patient status inpatient in the Med/Surge    NSTEMI: Cardiology following, Continue heparin gtt. Check Echocardiogram continue ASA, Lipitor. TME due to hypoglycemia: Check cortisol and thyroid function likely due to accidental bolus of insulin from insulin pump. Appears much improved. Aspiration pneumonia: continue Unasyn and azithromycin. Diabetes mellitus type II with hyperglycemia: Stop prednsione. Start lantus/bolus regimen insulin if glucose remains uncontrolled despise steroid discontinuation. COPD in acute exacerbation: chronically on 2 liters, continue prednisone, wean as able  Primary HTN  GERD  PTOT      Consultations:   IP CONSULT TO CRITICAL CARE  IP CONSULT TO CARDIOLOGY  IP CONSULT TO SOCIAL WORK     Patient is admitted as inpatient status because of co-morbidities listed above, severity of signs and symptoms as outlined, requirement for current medical therapies and most importantly because of direct risk to patient if care not provided in a hospital setting. Expected length of stay > 48 hours. Bonnetta Kayser, DO  12/25/2022  4:05 PM    Copy sent to Dr. Karoline Solis primary care provider on file.

## 2022-12-25 NOTE — PLAN OF CARE
Problem: Discharge Planning  Goal: Discharge to home or other facility with appropriate resources  Outcome: Progressing     Problem: Pain  Goal: Verbalizes/displays adequate comfort level or baseline comfort level  Outcome: Progressing     Problem: Respiratory - Adult  Goal: Achieves optimal ventilation and oxygenation  Outcome: Progressing     Problem: Cardiovascular - Adult  Goal: Maintains optimal cardiac output and hemodynamic stability  Outcome: Progressing  Goal: Absence of cardiac dysrhythmias or at baseline  Outcome: Progressing     Problem: Skin/Tissue Integrity - Adult  Goal: Skin integrity remains intact  Outcome: Progressing  Flowsheets (Taken 12/24/2022 2030)  Skin Integrity Remains Intact: Monitor for areas of redness and/or skin breakdown  Goal: Incisions, wounds, or drain sites healing without S/S of infection  Outcome: Progressing  Goal: Oral mucous membranes remain intact  Outcome: Progressing     Problem: Skin/Tissue Integrity - Adult  Goal: Incisions, wounds, or drain sites healing without S/S of infection  Outcome: Progressing     Problem: Musculoskeletal - Adult  Goal: Return mobility to safest level of function  Outcome: Progressing  Goal: Maintain proper alignment of affected body part  Outcome: Progressing  Goal: Return ADL status to a safe level of function  Outcome: Progressing     Problem: Infection - Adult  Goal: Absence of infection at discharge  Outcome: Progressing  Goal: Absence of infection during hospitalization  Outcome: Progressing  Goal: Absence of fever/infection during anticipated neutropenic period  Outcome: Progressing     Problem: Hematologic - Adult  Goal: Maintains hematologic stability  Outcome: Progressing     Problem: Skin/Tissue Integrity  Goal: Absence of new skin breakdown  Description: 1. Monitor for areas of redness and/or skin breakdown  2. Assess vascular access sites hourly  3. Every 4-6 hours minimum:  Change oxygen saturation probe site  4.   Every 4-6 hours:  If on nasal continuous positive airway pressure, respiratory therapy assess nares and determine need for appliance change or resting period.   Outcome: Progressing     Problem: Chronic Conditions and Co-morbidities  Goal: Patient's chronic conditions and co-morbidity symptoms are monitored and maintained or improved  Outcome: Progressing     Problem: Safety - Adult  Goal: Free from fall injury  Outcome: Progressing     Problem: ABCDS Injury Assessment  Goal: Absence of physical injury  Outcome: Progressing

## 2022-12-25 NOTE — PROGRESS NOTES
hemodynamically stable no acute respiratory distress were reported. She is on 3 L nasal cannula maintaining saturation 96%. According to patient he is feeling better she still have cough she has sputum production denies chest pain or fever. Chest x-ray on 2022 shows slight increase in perihilar congestion/infrahilar infiltrate. Echocardiogram shows apical hypokinesis EF of 50 to 55% mild to moderate aortic insufficiency    Review of Systems:  Review of Systems   Constitutional:  Positive for activity change and fatigue. Negative for fever. HENT:  Negative for postnasal drip, sore throat and trouble swallowing. Eyes:  Negative for photophobia, redness and visual disturbance. Respiratory:  Positive for cough, shortness of breath and wheezing. Cardiovascular:  Negative for chest pain, palpitations and leg swelling. Gastrointestinal:  Negative for abdominal pain, diarrhea and vomiting. Endocrine: Negative for polydipsia, polyphagia and polyuria. Genitourinary:  Negative for dysuria, frequency and hematuria. Musculoskeletal: Negative. Skin: Negative. Allergic/Immunologic: Negative. Neurological:  Positive for weakness. Negative for dizziness, seizures, syncope and speech difficulty. Hematological:  Negative for adenopathy. Does not bruise/bleed easily. Psychiatric/Behavioral: Negative.        OBJECTIVE     VITAL SIGNS:   LAST-  BP (!) 108/45   Pulse 75   Temp 98.5 °F (36.9 °C) (Oral)   Resp 15   Ht 5' (1.524 m)   Wt 145 lb (65.8 kg)   SpO2 97%   BMI 28.32 kg/m²   8-24 HR RANGE-  TEMP Temp  Av.6 °F (37 °C)  Min: 98.5 °F (36.9 °C)  Max: 98.8 °F (43.3 °C)   BP Systolic (07XZI), CGB:881 , Min:108 , LGK:554      Diastolic (65JTQ), ORC:61, Min:45, Max:56     PULSE Pulse  Av.4  Min: 72  Max: 99   RR Resp  Avg: 15  Min: 15  Max: 15   O2 SAT SpO2  Av %  Min: 97 %  Max: 97 %   OXYGEN DELIVERY O2 Flow Rate (L/min)  Avg: 3 L/min  Min: 3 L/min  Max: 3 L/min Systemic Examination:   Physical Exam  General appearance - looks comfortable and in no acute distress, mildly tachypneic  Mental status - alert, oriented to person, place, and time  Eyes - pupils equal and reactive, extraocular eye movements intact  Mouth - mucous membranes moist, pharynx normal without lesions  Neck - supple, no significant adenopathy  Chest - Chest was symmetrical without dullness to percussion. Breath sounds bilaterally were present with prolonged expiration slightly distant breath sound bilateral rhonchi and bilateral expiratory wheezing was present. There is no intercostal recession or use of accessory muscles  Heart - normal rate, regular rhythm, normal S1, S2, no murmurs, rubs, clicks or gallops  Abdomen - soft, nontender, nondistended, no masses or organomegaly  Neurological - alert, oriented, normal speech, no focal findings or movement disorder noted  Extremities - peripheral pulses normal, no pedal edema, no clubbing or cyanosis  Skin - normal coloration and turgor, no rashes, no suspicious skin lesions noted     DATA REVIEW     Medications:  Scheduled Meds:   sodium chloride flush  5-40 mL IntraVENous 2 times per day    aspirin  81 mg Oral Daily    atorvastatin  40 mg Oral Nightly    ampicillin-sulbactam  3,000 mg IntraVENous Q6H    insulin lispro  0-8 Units SubCUTAneous TID WC    insulin lispro  0-4 Units SubCUTAneous Nightly     Continuous Infusions:   sodium chloride 10 mL/hr at 12/24/22 1847    heparin (PORCINE) Infusion 16 Units/kg/hr (12/25/22 0601)    dextrose       LABS:-  ABG:   No results for input(s): POCPH, POCPCO2, POCPO2, POCHCO3, MQDO9ULK in the last 72 hours.   CBC:   Recent Labs     12/23/22  2119 12/24/22  0525 12/25/22  0455   WBC 10.9 8.6 8.3   HGB 14.3 11.8* 11.1*   HCT 46.8 39.1 37.8   MCV 99.4 99.7 102.2   PLT See Reflexed IPF Result 144 See Reflexed IPF Result   LYMPHOPCT 12* 13* 19*   RBC 4.71 3.92* 3.70*   MCH 30.4 30.1 30.0   MCHC 30.6 30.2 29.4   RDW 15.1* 14.9* 14.6*     BMP:   Recent Labs     12/23/22 2119 12/23/22 2321 12/24/22  0525 12/25/22  0455     --  142 133*   K 4.6  --  4.6 4.4     --  108* 100   CO2 27  --  24 24   BUN 29*  --  27* 29*   CREATININE 0.64  --  0.67 0.72   GLUCOSE 36* 88 109* 225*     Liver Function Test:   Recent Labs     12/24/22  0525   PROT 5.8*   LABALBU 3.4*   ALT 15   AST 25   ALKPHOS 123*   BILITOT 0.3     Amylase/Lipase:  Recent Labs     12/23/22 2119   LIPASE 16     Coagulation Profile:   Recent Labs     12/24/22  1415 12/24/22 2057 12/25/22  0455   APTT 48.1* 55.2* 46.2*     Cardiac Enzymes:  Recent Labs     12/23/22 2119   CKTOTAL 72     Lactic Acid:  No results found for: LACTA  BNP:   No results found for: BNP  D-Dimer:  No results found for: DDIMER  Others:   Lab Results   Component Value Date    TSH 2.62 12/23/2022     No results found for: BEE, RHEUMFACTOR, SEDRATE, CRP  No results found for: Erick Aguilera  No results found for: IRON, TIBC, FERRITIN  No results found for: SPEP, UPEP  No results found for: PSA, CEA, , ES5303,     Input/Output:    Intake/Output Summary (Last 24 hours) at 12/25/2022 1059  Last data filed at 12/24/2022 1847  Gross per 24 hour   Intake 357.06 ml   Output 625 ml   Net -267.94 ml       Microbiology:  Recent Labs     12/23/22 2110 12/23/22 2115 12/24/22  0026 12/24/22  [address] . BLOOD . BLOOD   < > . NASAL SWAB   SPECIAL UNKNOWN  --   --   --    CULTURE NO GROWTH 1 DAY NO GROWTH 1 DAY  --   --     < > = values in this interval not displayed. Pathology:    Radiology reports:  XR CHEST PORTABLE   Final Result   Perihilar consolidations with interval worsening. CT HEAD WO CONTRAST   Final Result   No acute intracranial abnormality. No acute territorial infarction,   intracranial hemorrhage or mass lesion. Status post right canal wall down mastoidectomy with soft tissue density   within the mastoidectomy bowl.          XR CHEST PORTABLE Final Result   Increased lung markings bilaterally, may be related to mild pulmonary   vascular congestion versus bronchitis. Echocardiogram:   No results found for this or any previous visit. Cardiac Catheterization:   No results found for this or any previous visit. ASSESSMENT AND PLAN     Assessment:    //Acute hypoxic hypercapnic respiratory failure  //Chronic hypoxic respiratory failure  //Bilateral pulmonary congestion/infrahilar interstitial infiltrate  //Bronchiolitis/bronchopneumonia  //COPD exacerbation  //Non-ST elevation MI  //COPD on home oxygen severity not known  //Diabetes mellitus    Plan:    Patient currently is hemodynamically stable she is having bilateral expiratory wheezing and rhonchi. Will continue with Unasyn and will add Zithromax. Will start patient on prednisone. Bronchodilator with DuoNeb aerosol and add Symbicort. Repeat chest x-ray PA lateral view tomorrow  Follow-up with cardiology. Currently she is on aspirin and statin and on heparin drip. Will likely need ischemia work-up. Will use NIV/BiPAP if needed  Continue supplemental oxygen to keep oxygen saturation greater than 92%  Continue to monitor I/O with a goal of even/negative fluid balance  May need intermittent Lasix if signs of pulm edema   DVT prophylaxis currently on therapeutic heparin drip  Physical/occupational/speech therapy; increase activity as tolerated    Discussed with nursing staff, treatment plan discussed. I updated the patient regarding the current clinical condition, provisional diagnosis and management plan. I addressed concerns and answered all questions to the best of my abilities. It was my pleasure to evaluate Vishnu Velasquez today. We will continue to follow. I would like to thank you for allowing me to participate in the care of this patient. Please feel free to call with any further questions or concerns. Gissell Camejo MD, M.D.    Pulmonary and Critical Care

## 2022-12-25 NOTE — PLAN OF CARE
Problem: Discharge Planning  Goal: Discharge to home or other facility with appropriate resources  12/25/2022 1805 by Arabella Victoria RN  Outcome: Progressing  12/25/2022 0536 by Wil Willard RN  Outcome: Progressing     Problem: Pain  Goal: Verbalizes/displays adequate comfort level or baseline comfort level  12/25/2022 1805 by Arabella Victoria RN  Outcome: Progressing  12/25/2022 0536 by Wil Willard RN  Outcome: Progressing     Problem: Respiratory - Adult  Goal: Achieves optimal ventilation and oxygenation  12/25/2022 1805 by Arabella Victoria RN  Outcome: Progressing  12/25/2022 1254 by Romy Son RCP  Outcome: Progressing  12/25/2022 0536 by Wil Willard RN  Outcome: Progressing     Problem: Cardiovascular - Adult  Goal: Maintains optimal cardiac output and hemodynamic stability  12/25/2022 1805 by Arabella Victoria RN  Outcome: Progressing  12/25/2022 0536 by Wil Willard RN  Outcome: Progressing  Goal: Absence of cardiac dysrhythmias or at baseline  12/25/2022 1805 by Arabella Victoria RN  Outcome: Progressing  12/25/2022 0536 by Wil Willard RN  Outcome: Progressing     Problem: Skin/Tissue Integrity - Adult  Goal: Skin integrity remains intact  12/25/2022 1805 by Arabella Victoria RN  Outcome: Progressing  12/25/2022 0536 by Wil Willard RN  Outcome: Progressing  Flowsheets (Taken 12/24/2022 2030)  Skin Integrity Remains Intact: Monitor for areas of redness and/or skin breakdown  Goal: Incisions, wounds, or drain sites healing without S/S of infection  12/25/2022 1805 by Arabella Victoria RN  Outcome: Progressing  12/25/2022 0536 by Wil Willard RN  Outcome: Progressing  Goal: Oral mucous membranes remain intact  12/25/2022 1805 by Arabella Victoria RN  Outcome: Progressing  12/25/2022 0536 by Wil Willard RN  Outcome: Progressing     Problem: Musculoskeletal - Adult  Goal: Return mobility to safest level of function  12/25/2022 1805 by Arabella Victoria RN  Outcome: Progressing  12/25/2022 0536 by Stephany Nunez RN  Outcome: Progressing  Goal: Maintain proper alignment of affected body part  12/25/2022 1805 by Andrei Jon RN  Outcome: Progressing  12/25/2022 0536 by Stephany Nunez RN  Outcome: Progressing  Goal: Return ADL status to a safe level of function  12/25/2022 1805 by Andrei Jon RN  Outcome: Progressing  12/25/2022 0536 by Stephany Nunez RN  Outcome: Progressing     Problem: Infection - Adult  Goal: Absence of infection at discharge  12/25/2022 1805 by Andrei Jon RN  Outcome: Progressing  12/25/2022 0536 by Stephany Nunez RN  Outcome: Progressing  Goal: Absence of infection during hospitalization  12/25/2022 1805 by Andrei Jon RN  Outcome: Progressing  12/25/2022 0536 by Stephany Nunez RN  Outcome: Progressing  Goal: Absence of fever/infection during anticipated neutropenic period  12/25/2022 1805 by Andrei Jon RN  Outcome: Progressing  12/25/2022 0536 by Stephany Nunez RN  Outcome: Progressing     Problem: Hematologic - Adult  Goal: Maintains hematologic stability  12/25/2022 1805 by Andrei Jon RN  Outcome: Progressing  12/25/2022 0536 by Stephany Nunez RN  Outcome: Progressing     Problem: Skin/Tissue Integrity  Goal: Absence of new skin breakdown  Description: 1. Monitor for areas of redness and/or skin breakdown  2. Assess vascular access sites hourly  3. Every 4-6 hours minimum:  Change oxygen saturation probe site  4. Every 4-6 hours:  If on nasal continuous positive airway pressure, respiratory therapy assess nares and determine need for appliance change or resting period.   12/25/2022 1805 by Andrei Jon RN  Outcome: Progressing  12/25/2022 0536 by Stephany Nunez RN  Outcome: Progressing     Problem: Chronic Conditions and Co-morbidities  Goal: Patient's chronic conditions and co-morbidity symptoms are monitored and maintained or improved  12/25/2022 1805 by Andrei Jon RN  Outcome: Progressing  12/25/2022 0536 by Stephany Nunez RN  Outcome: Progressing     Problem: Safety - Adult  Goal: Free from fall injury  12/25/2022 1805 by Mazin Hayward RN  Outcome: Progressing  12/25/2022 0536 by Mikel Moses RN  Outcome: Progressing     Problem: ABCDS Injury Assessment  Goal: Absence of physical injury  12/25/2022 1805 by Mazin Hayward RN  Outcome: Progressing  12/25/2022 0536 by Mikel Moses RN  Outcome: Progressing

## 2022-12-26 ENCOUNTER — APPOINTMENT (OUTPATIENT)
Dept: GENERAL RADIOLOGY | Age: 67
DRG: 177 | End: 2022-12-26
Payer: COMMERCIAL

## 2022-12-26 PROBLEM — J44.1 COPD WITH ACUTE EXACERBATION (HCC): Status: ACTIVE | Noted: 2022-12-26

## 2022-12-26 PROBLEM — R41.82 AMS (ALTERED MENTAL STATUS): Status: RESOLVED | Noted: 2022-12-23 | Resolved: 2022-12-26

## 2022-12-26 PROBLEM — R77.8 ELEVATED TROPONIN LEVEL NOT DUE MYOCARDIAL INFARCTION: Status: ACTIVE | Noted: 2022-12-24

## 2022-12-26 LAB
ABSOLUTE EOS #: <0.03 K/UL (ref 0–0.44)
ABSOLUTE IMMATURE GRANULOCYTE: 0.03 K/UL (ref 0–0.3)
ABSOLUTE LYMPH #: 1.68 K/UL (ref 1.1–3.7)
ABSOLUTE MONO #: 0.61 K/UL (ref 0.1–1.2)
ANION GAP SERPL CALCULATED.3IONS-SCNC: 10 MMOL/L (ref 9–17)
BASOPHILS # BLD: 0 % (ref 0–2)
BASOPHILS ABSOLUTE: <0.03 K/UL (ref 0–0.2)
BUN BLDV-MCNC: 20 MG/DL (ref 8–23)
CALCIUM SERPL-MCNC: 8.9 MG/DL (ref 8.6–10.4)
CHLORIDE BLD-SCNC: 103 MMOL/L (ref 98–107)
CO2: 24 MMOL/L (ref 20–31)
CREAT SERPL-MCNC: 0.67 MG/DL (ref 0.5–0.9)
EOSINOPHILS RELATIVE PERCENT: 0 % (ref 1–4)
GFR SERPL CREATININE-BSD FRML MDRD: >60 ML/MIN/1.73M2
GLUCOSE BLD-MCNC: 268 MG/DL (ref 65–105)
GLUCOSE BLD-MCNC: 272 MG/DL (ref 70–99)
GLUCOSE BLD-MCNC: 312 MG/DL (ref 65–105)
GLUCOSE BLD-MCNC: 329 MG/DL (ref 65–105)
GLUCOSE BLD-MCNC: 390 MG/DL (ref 65–105)
HCT VFR BLD CALC: 40.7 % (ref 36.3–47.1)
HEMOGLOBIN: 12.1 G/DL (ref 11.9–15.1)
IMMATURE GRANULOCYTES: 0 %
LYMPHOCYTES # BLD: 22 % (ref 24–43)
MCH RBC QN AUTO: 30.5 PG (ref 25.2–33.5)
MCHC RBC AUTO-ENTMCNC: 29.7 G/DL (ref 28.4–34.8)
MCV RBC AUTO: 102.5 FL (ref 82.6–102.9)
MONOCYTES # BLD: 8 % (ref 3–12)
NRBC AUTOMATED: 0 PER 100 WBC
PARTIAL THROMBOPLASTIN TIME: 34.3 SEC (ref 20.5–30.5)
PDW BLD-RTO: 14.3 % (ref 11.8–14.4)
PLATELET # BLD: 120 K/UL (ref 138–453)
PMV BLD AUTO: 12.3 FL (ref 8.1–13.5)
POTASSIUM SERPL-SCNC: 4.5 MMOL/L (ref 3.7–5.3)
RBC # BLD: 3.97 M/UL (ref 3.95–5.11)
SEG NEUTROPHILS: 70 % (ref 36–65)
SEGMENTED NEUTROPHILS ABSOLUTE COUNT: 5.44 K/UL (ref 1.5–8.1)
SODIUM BLD-SCNC: 137 MMOL/L (ref 135–144)
WBC # BLD: 7.8 K/UL (ref 3.5–11.3)

## 2022-12-26 PROCEDURE — 71045 X-RAY EXAM CHEST 1 VIEW: CPT

## 2022-12-26 PROCEDURE — 99233 SBSQ HOSP IP/OBS HIGH 50: CPT | Performed by: INTERNAL MEDICINE

## 2022-12-26 PROCEDURE — 6360000002 HC RX W HCPCS

## 2022-12-26 PROCEDURE — 6370000000 HC RX 637 (ALT 250 FOR IP): Performed by: INTERNAL MEDICINE

## 2022-12-26 PROCEDURE — 94640 AIRWAY INHALATION TREATMENT: CPT

## 2022-12-26 PROCEDURE — 36415 COLL VENOUS BLD VENIPUNCTURE: CPT

## 2022-12-26 PROCEDURE — 1200000000 HC SEMI PRIVATE

## 2022-12-26 PROCEDURE — 6370000000 HC RX 637 (ALT 250 FOR IP)

## 2022-12-26 PROCEDURE — 85730 THROMBOPLASTIN TIME PARTIAL: CPT

## 2022-12-26 PROCEDURE — 97166 OT EVAL MOD COMPLEX 45 MIN: CPT

## 2022-12-26 PROCEDURE — 2580000003 HC RX 258

## 2022-12-26 PROCEDURE — 85025 COMPLETE CBC W/AUTO DIFF WBC: CPT

## 2022-12-26 PROCEDURE — 97116 GAIT TRAINING THERAPY: CPT

## 2022-12-26 PROCEDURE — 97530 THERAPEUTIC ACTIVITIES: CPT

## 2022-12-26 PROCEDURE — 99232 SBSQ HOSP IP/OBS MODERATE 35: CPT | Performed by: INTERNAL MEDICINE

## 2022-12-26 PROCEDURE — 6360000002 HC RX W HCPCS: Performed by: INTERNAL MEDICINE

## 2022-12-26 PROCEDURE — 97110 THERAPEUTIC EXERCISES: CPT

## 2022-12-26 PROCEDURE — 82947 ASSAY GLUCOSE BLOOD QUANT: CPT

## 2022-12-26 PROCEDURE — 2580000003 HC RX 258: Performed by: INTERNAL MEDICINE

## 2022-12-26 PROCEDURE — 80048 BASIC METABOLIC PNL TOTAL CA: CPT

## 2022-12-26 PROCEDURE — 2580000003 HC RX 258: Performed by: STUDENT IN AN ORGANIZED HEALTH CARE EDUCATION/TRAINING PROGRAM

## 2022-12-26 RX ORDER — INSULIN LISPRO 100 [IU]/ML
0-8 INJECTION, SOLUTION INTRAVENOUS; SUBCUTANEOUS
Status: DISCONTINUED | OUTPATIENT
Start: 2022-12-26 | End: 2022-12-27 | Stop reason: HOSPADM

## 2022-12-26 RX ORDER — CARVEDILOL 3.12 MG/1
3.12 TABLET ORAL 2 TIMES DAILY WITH MEALS
Status: DISCONTINUED | OUTPATIENT
Start: 2022-12-26 | End: 2022-12-27 | Stop reason: HOSPADM

## 2022-12-26 RX ORDER — INSULIN LISPRO 100 [IU]/ML
0-4 INJECTION, SOLUTION INTRAVENOUS; SUBCUTANEOUS NIGHTLY
Status: DISCONTINUED | OUTPATIENT
Start: 2022-12-26 | End: 2022-12-27 | Stop reason: HOSPADM

## 2022-12-26 RX ORDER — INSULIN GLARGINE 100 [IU]/ML
12 INJECTION, SOLUTION SUBCUTANEOUS DAILY
Status: DISCONTINUED | OUTPATIENT
Start: 2022-12-26 | End: 2022-12-27 | Stop reason: HOSPADM

## 2022-12-26 RX ADMIN — INSULIN LISPRO 4 UNITS: 100 INJECTION, SOLUTION INTRAVENOUS; SUBCUTANEOUS at 21:16

## 2022-12-26 RX ADMIN — IPRATROPIUM BROMIDE AND ALBUTEROL SULFATE 1 AMPULE: .5; 3 SOLUTION RESPIRATORY (INHALATION) at 11:41

## 2022-12-26 RX ADMIN — BUDESONIDE AND FORMOTEROL FUMARATE DIHYDRATE 2 PUFF: 160; 4.5 AEROSOL RESPIRATORY (INHALATION) at 21:30

## 2022-12-26 RX ADMIN — AMPICILLIN SODIUM AND SULBACTAM SODIUM 3000 MG: 2; 1 INJECTION, POWDER, FOR SOLUTION INTRAMUSCULAR; INTRAVENOUS at 20:23

## 2022-12-26 RX ADMIN — HEPARIN SODIUM 18 UNITS/KG/HR: 10000 INJECTION, SOLUTION INTRAVENOUS at 11:31

## 2022-12-26 RX ADMIN — ASPIRIN 81 MG: 81 TABLET, CHEWABLE ORAL at 08:58

## 2022-12-26 RX ADMIN — INSULIN GLARGINE 12 UNITS: 100 INJECTION, SOLUTION SUBCUTANEOUS at 13:34

## 2022-12-26 RX ADMIN — AMPICILLIN SODIUM AND SULBACTAM SODIUM 3000 MG: 2; 1 INJECTION, POWDER, FOR SOLUTION INTRAMUSCULAR; INTRAVENOUS at 02:25

## 2022-12-26 RX ADMIN — IPRATROPIUM BROMIDE AND ALBUTEROL SULFATE 1 AMPULE: .5; 3 SOLUTION RESPIRATORY (INHALATION) at 21:30

## 2022-12-26 RX ADMIN — INSULIN LISPRO 4 UNITS: 100 INJECTION, SOLUTION INTRAVENOUS; SUBCUTANEOUS at 08:58

## 2022-12-26 RX ADMIN — SODIUM CHLORIDE, PRESERVATIVE FREE 10 ML: 5 INJECTION INTRAVENOUS at 21:02

## 2022-12-26 RX ADMIN — PREDNISONE 40 MG: 20 TABLET ORAL at 08:57

## 2022-12-26 RX ADMIN — AMPICILLIN SODIUM AND SULBACTAM SODIUM 3000 MG: 2; 1 INJECTION, POWDER, FOR SOLUTION INTRAMUSCULAR; INTRAVENOUS at 07:25

## 2022-12-26 RX ADMIN — DESMOPRESSIN ACETATE 40 MG: 0.2 TABLET ORAL at 21:06

## 2022-12-26 RX ADMIN — IPRATROPIUM BROMIDE AND ALBUTEROL SULFATE 1 AMPULE: .5; 3 SOLUTION RESPIRATORY (INHALATION) at 08:44

## 2022-12-26 RX ADMIN — HEPARIN SODIUM 1970 UNITS: 1000 INJECTION INTRAVENOUS; SUBCUTANEOUS at 11:22

## 2022-12-26 RX ADMIN — IPRATROPIUM BROMIDE AND ALBUTEROL SULFATE 1 AMPULE: .5; 3 SOLUTION RESPIRATORY (INHALATION) at 16:58

## 2022-12-26 RX ADMIN — Medication 500 MG: at 11:27

## 2022-12-26 RX ADMIN — INSULIN LISPRO 6 UNITS: 100 INJECTION, SOLUTION INTRAVENOUS; SUBCUTANEOUS at 11:57

## 2022-12-26 RX ADMIN — AMPICILLIN SODIUM AND SULBACTAM SODIUM 3000 MG: 2; 1 INJECTION, POWDER, FOR SOLUTION INTRAMUSCULAR; INTRAVENOUS at 13:35

## 2022-12-26 RX ADMIN — INSULIN LISPRO 8 UNITS: 100 INJECTION, SOLUTION INTRAVENOUS; SUBCUTANEOUS at 17:06

## 2022-12-26 RX ADMIN — BUDESONIDE AND FORMOTEROL FUMARATE DIHYDRATE 2 PUFF: 160; 4.5 AEROSOL RESPIRATORY (INHALATION) at 08:45

## 2022-12-26 ASSESSMENT — ENCOUNTER SYMPTOMS
COUGH: 1
TROUBLE SWALLOWING: 0
ABDOMINAL PAIN: 0
WHEEZING: 0
VOMITING: 0
EYE REDNESS: 0
SHORTNESS OF BREATH: 0
SORE THROAT: 0
ALLERGIC/IMMUNOLOGIC NEGATIVE: 1
DIARRHEA: 0
PHOTOPHOBIA: 0

## 2022-12-26 NOTE — PROGRESS NOTES
Occupational Therapy  Facility/Department: [facility]  Occupational Therapy Initial Assessment    C/C:       Chief Complaint   Patient presents with    Altered Mental Status     Name: Lavonne Pelletier  : 1955  MRN: [id number]  Date of Service: 2022    Discharge Recommendations:  Patient would benefit from continued therapy after discharge  OT Equipment Recommendations  Equipment Needed: No       Patient Diagnosis(es): The primary encounter diagnosis was Altered mental status, unspecified altered mental status type. Diagnoses of Hypoglycemia and Opioid overdose, undetermined intent, initial encounter [facility]) were also pertinent to this visit. Past Medical History:  has a past medical history of CHF (congestive heart failure) ([facility] [address] COPD (chronic obstructive pulmonary disease) ([facility] [address] DMII (diabetes mellitus, type 2) ([facility] [address] GERD (gastroesophageal reflux disease), HTN (hypertension), and Opioid overdose ([facility] [address] Past Surgical History:  has a past surgical history that includes Eye surgery and Colonoscopy. Assessment   Performance deficits / Impairments: Decreased functional mobility ; Decreased ADL status; Decreased safe awareness;Decreased high-level IADLs;Decreased balance  Assessment: Pt limited in self care d/t identified performance deficits. Pt displays decreased cognition and safety awareness throughout session however appears to be improved since admission per chart review. [address] return with cuing for safety/RW management concerning for pt completing mobility/ADL's IND.  Pt would benefit from continued therapy to increase safety/IND in ADL's  Prognosis: Good  Decision Making: Medium Complexity  REQUIRES OT FOLLOW-UP: Yes  Activity Tolerance  Activity Tolerance: Treatment limited secondary to decreased cognition;Patient Tolerated treatment well        Plan   Occupational Therapy Plan  Times Per Week: 2-4x/week  Current Treatment Recommendations: Balance training, Functional mobility training, Cognitive reorientation, Safety education & training, Patient/Caregiver education & training, Equipment evaluation, education, & procurement, Self-Care / ADL, Cognitive/Perceptual training     Restrictions  Restrictions/Precautions  Restrictions/Precautions: Up as Tolerated  Position Activity Restriction  Other position/activity restrictions: up with assist    Subjective   General  Patient assessed for rehabilitation services?: Yes  Family / Caregiver Present: No  General Comment  Comments: RN okayed for therapy. Pt agreeable to OT eval. Pt denies pain throughout session     Social/Functional History  Social/Functional History  Lives With: Other (comment) (sister and nephew)  Type of Home: House  Home Layout: One level  Home Access: Level entry  Entrance Stairs - Number of Steps: 2  Entrance Stairs - Rails: Both  Bathroom Shower/Tub: Tub/Shower unit, Shower chair with back  Bathroom Toilet: Standard  Bathroom Equipment: Grab bars in shower, Toilet raiser, Shower chair  Bathroom Accessibility: Accessible  Home Equipment: Nyoka Feil, rolling, Oxygen  Receives Help From: Family  ADL Assistance: Independent  Bath: Independent  Dressing: Independent  Grooming: Independent  Feeding: Independent  Toileting: Independent  Homemaking Assistance: Independent  Homemaking Responsibilities: Yes  Ambulation Assistance: Independent  Transfer Assistance: Independent  Active : No  Patient's  Info: Her sister, Abby Barakat drives her  Mode of Transportation: [employer]  Occupation: On disability  Type of Occupation: Worked at [employer]  Additional Comments: Pt reports sister and nephew can assist PRN, however pt also states pt nephew had recent heart attack and requires assistance. Pt is questionable historian and at times gives conflicting information       Objective     Safety Devices  Type of Devices: Call light within reach; Patient at risk for falls;Gait belt;Left in chair    Restraints  Restraints Initially in Place: No    Bed Mobility Training  Bed Mobility Training: No (up in chair at start and end of session)    Balance  Sitting: Without support (SBA edge of recliner)  Standing: With support (CGA w/RW)    Transfer Training  Transfer Training: Yes  Overall Level of Assistance: Contact-guard assistance; Adaptive equipment  Sit to Stand: Contact-guard assistance; Adaptive equipment  Stand to Sit: Contact-guard assistance; Adaptive equipment    Gait  Overall Level of Assistance: Contact-guard assistance; Adaptive equipment (cues for RW management; Fair return)  Assistive Device: Walker, rolling;Gait belt     AROM: Within functional limits  Strength: Within functional limits (4/5 BUE grossly)  Coordination: Within functional limits  Tone: Normal  Sensation: Intact    ADL  Feeding: Independent  Grooming: Stand by assistance  UE Bathing: Stand by assistance  LE Bathing: Contact guard assistance  UE Dressing: Stand by assistance  LE Dressing: Contact guard assistance  Toileting: Contact guard assistance  Additional Comments: Pt up in chair at start and end of session. CGA functional transfers and mobility w/RW and cues required for proper use of RW. Pt observed lifting RW off the floor to turn despite cues for use and safety. Pt able to don/doff B socks seated in recliner. BUE strength/ROM WFL. Pt requires SBA/CGA for many ADL's d/t concern for safety              Vision  Vision: Impaired  Vision Exceptions: Wears glasses for reading    Hearing  Hearing: Exceptions to [facility]  Hearing Exceptions: Right hearing aid;Hard of hearing/hearing concerns (doesnt wear R hearing aid, though has one. R ear hard of hearing only)    Cognition  Overall Cognitive Status: Exceptions  Following Commands:  Follows one step commands with repetition  Safety Judgement: Decreased awareness of need for assistance;Decreased awareness of need for safety  Problem Solving: Assistance required to identify errors made  Insights: Decreased awareness of deficits  Initiation: Requires cues for some  Sequencing: Requires cues for some  Orientation  Overall Orientation Status: Impaired  Orientation Level: Oriented to person;Oriented to time;Oriented to situation;Disoriented to place (knows she is in the hospital but [facility])                  Education Given To: Patient  Education Provided: Role of Therapy;Plan of Care;Transfer Training;Equipment  Education Provided Comments: RW management, safety in transfers;  Fair return  Education Method: Verbal  Barriers to Learning: Cognition  Education Outcome: Continued education needed;Verbalized understanding             AM-PAC Score        AM-PAC Inpatient Daily Activity Raw Score: 19 (12/26/22 1618)  AM-PAC Inpatient ADL T-Scale Score : 40.22 (12/26/22 1618)  ADL Inpatient CMS 0-100% Score: 42.8 (12/26/22 1618)  ADL Inpatient CMS G-Code Modifier : CK (12/26/22 1618)        Goals  Short Term Goals  Time Frame for Short Term Goals: By discharge  Short Term Goal 1: Pt will complete UB ADL's IND  Short Term Goal 2: Pt will complete LB ADL's Mod I with AE/DME/modified techniques  Short Term Goal 3: Pt will complete functional mobility/transfers Mod I w/LRD  Short Term Goal 4: Pt will demo Good safety throughout session with Min VC's       Therapy Time   Individual Concurrent Group Co-treatment   Time In 8098         Time Out 1602         Minutes 18         Timed Code Treatment Minutes: 8 Minutes       Lesvia Snider OTR/L

## 2022-12-26 NOTE — PLAN OF CARE
Problem: Respiratory - Adult  Goal: Achieves optimal ventilation and oxygenation  12/26/2022 0847 by Neida Serrano RCP  Outcome: Progressing     Problem: Respiratory - Adult  Intervention: Manage nebulizer treatments  Note: BRONCHOSPASM/BRONCHOCONSTRICTION     [x]         IMPROVE AERATION/BREATH SOUNDS  [x]   ADMINISTER BRONCHODILATOR THERAPY AS APPROPRIATE  [x]   ASSESS BREATH SOUNDS  []   IMPLEMENT AEROSOL/MDI PROTOCOL  [x]   PATIENT EDUCATION AS NEEDED

## 2022-12-26 NOTE — PROGRESS NOTES
[facility]  Office: [address], , Karina Luis Antonio, DO, Michellenayana Michelle, DO, Brian Cruz, DO, Herb Meyer MD, Dave Mcclellan MD, Jacinto Cross MD, Lester Moreno MD,  El Ly MD, Lexii Ramos MD, Dinorah Lomeli, DO, Sunday Su MD,  Yin Frazier MD, Navneet Grewal MD, Dede Tapia DO, Maty Carlton MD, Anahi Hagan MD, Feliz Nuno DO, Sil Stevens MD, Antonia Cast MD, Monica Ramachandran MD, Delvis Rojas MD, Jaylene Ramirez DO, Nazia Ritchie MD, Raul Coates MD, Kit Pleitez, CNP,  Sonal Palacios, CNP, Elva Combs, CNP, Yvrose Tripathi, CNP,  Yudith Erickson, [facility], Brandon Thomson, CNP, Earnest Jimenez, CNP, David Ramos, CNP, Elijah Lepe, CNP, Millie Nickerson, CNP, Maria Luisa Olivas PA-C, Martin Merino, [facility], Golden Painter, CNP, Riaz Mata, [facility]    Progress Note    12/26/2022    12:16 PM    Name:   Denae Perez  MRN:     [id number]     Acct:      [de-identified]   Room:   [address] [place] [address] Day:  3  Admit Date:  12/23/2022  8:05 PM    PCP:   No primary care provider on file. Code Status:  Full Code    Subjective:     C/C:   Chief Complaint   Patient presents with    Altered Mental Status     Interval History Status: significantly improved. Pt feeling better today. No fevers, chills, nausea, vomiting or diarrhea. Glucose elevated 312 today. Troponin stable. Brief History:   Denae Perez is a 78 y.o. female who presents to our hospital after being found unresponsive. Patient accidentally bloused herself from her insulin pump and was hypoglycemic. She was admitted to the ICU for mangemnt. She was treated for her hypoglycemic episode . She did develop a new oxygen requirment tahat improved. She was starte don Unasyn due to concern for aspiration pneumoina.        Review of Systems:     Constitutional:  negative for chills, fevers, sweats  Respiratory:  negative for cough, dyspnea on exertion, shortness of breath, wheezing  Cardiovascular:  negative for chest pain, chest pressure/discomfort, lower extremity edema, palpitations  Gastrointestinal:  negative for abdominal pain, constipation, diarrhea, nausea, vomiting  Neurological:  negative for dizziness, headache    Medications: Allergies: Allergies   Allergen Reactions    Bactrim [Sulfamethoxazole-Trimethoprim]     Lidocaine        Current Meds:   Scheduled Meds:    carvedilol  3.125 mg Oral BID WC    insulin glargine  12 Units SubCUTAneous Daily    insulin lispro  0-8 Units SubCUTAneous TID WC    insulin lispro  0-4 Units SubCUTAneous Nightly    ipratropium-albuterol  1 ampule Inhalation 4x daily    budesonide-formoterol  2 puff Inhalation BID    azithromycin  500 mg IntraVENous Q24H    predniSONE  40 mg Oral Daily    sodium chloride flush  5-40 mL IntraVENous 2 times per day    aspirin  81 mg Oral Daily    atorvastatin  40 mg Oral Nightly    ampicillin-sulbactam  3,000 mg IntraVENous Q6H     Continuous Infusions:    sodium chloride 10 mL/hr at 22 1847    dextrose       PRN Meds: sodium chloride flush, sodium chloride, ondansetron **OR** ondansetron, polyethylene glycol, acetaminophen **OR** acetaminophen, albuterol, ipratropium, glucose, dextrose bolus **OR** dextrose bolus, glucagon (rDNA), dextrose    Data:     Past Medical History:   has a past medical history of CHF (congestive heart failure) ([facility] COPD (chronic obstructive pulmonary disease) ([facility] DMII (diabetes mellitus, type 2) ([facility] GERD (gastroesophageal reflux disease), HTN (hypertension), and Opioid overdose ([facility] Social History:        Family History: No family history on file.     Vitals:  BP (!) 153/58   Pulse 77   Temp 97.4 °F (36.3 °C) (Oral)   Resp 17   Ht 5' (1.524 m)   Wt 145 lb (65.8 kg)   SpO2 100%   BMI 28.32 kg/m²   Temp (24hrs), Av °F (36.7 °C), Min:97.4 °F (36.3 °C), Max:98.3 °F (36.8 °C)    Recent Labs     12/25/22 2015 12/25/22  2342 12/26/22  0829 12/26/22  1115   POCGLU 295* 275* 268* 312*       I/O (24Hr): Intake/Output Summary (Last 24 hours) at 12/26/2022 1216  Last data filed at 12/26/2022 0655  Gross per 24 hour   Intake 1069.5 ml   Output 2000 ml   Net -930.5 ml       Labs:  Hematology:  Recent Labs     12/24/22  0525 12/25/22  0455 12/26/22  0942   WBC 8.6 8.3 7.8   RBC 3.92* 3.70* 3.97   HGB 11.8* 11.1* 12.1   HCT 39.1 37.8 40.7   MCV 99.7 102.2 102.5   MCH 30.1 30.0 30.5   MCHC 30.2 29.4 29.7   RDW 14.9* 14.6* 14.3    See Reflexed IPF Result 120*   MPV 12.2  --  12.3     Chemistry:  Recent Labs     12/23/22 2119 12/23/22 2321 12/24/22  0525 12/24/22  0711 12/24/22  1415 12/25/22  0455 12/25/22  1231 12/25/22  1602 12/26/22  0942     --  142  --   --  133*  --   --  137   K 4.6  --  4.6  --   --  4.4  --   --  4.5     --  108*  --   --  100  --   --  103   CO2 27  --  24  --   --  24  --   --  24   GLUCOSE 36*   < > 109*  --   --  225*  --   --  272*   BUN 29*  --  27*  --   --  29*  --   --  20   CREATININE 0.64  --  0.67  --   --  0.72  --   --  0.67   MG 2.2  --  2.7*  --   --   --   --   --   --    ANIONGAP 11  --  10  --   --  9  --   --  10   LABGLOM 60*  --  60*  --   --  >60  --   --  >60   CALCIUM 9.7  --  8.6  --   --  8.4*  --   --  8.9   CAION  --   --   --  1.31  --   --   --   --   --    PROBNP 65  --   --   --   --   --   --   --   --    TROPHS 21*  --  210*  --    < > 111* 81* 82*  --    CKTOTAL 72  --   --   --   --   --   --   --   --    MYOGLOBIN 333*  --   --   --   --   --   --   --   --    LACTACIDWB 1.7  --   --  0.8  --   --   --   --   --     < > = values in this interval not displayed.      Recent Labs     12/23/22  2119 12/23/22  2321 12/24/22  0525 12/24/22  0650 12/25/22  1144 12/25/22  1722 12/25/22 2015 12/25/22  2342 12/26/22  0829 12/26/22  1115   PROT 7.1  --  5.8*  --   --   --   --   --   --   --    LABALBU 4.3  --  3.4*  -- --   --   --   --   --   --    TSH 2.62  --   --   --   --   --   --   --   --   --    AST 22  --  25  --   --   --   --   --   --   --    ALT 19  --  15  --   --   --   --   --   --   --    ALKPHOS 152*  --  123*  --   --   --   --   --   --   --    BILITOT 0.2*  --  0.3  --   --   --   --   --   --   --    BILIDIR <0.1  --   --   --   --   --   --   --   --   --    LIPASE 16  --   --   --   --   --   --   --   --   --    POCGLU  --    < >  --    < > 280* 251* 295* 275* 268* 312*    < > = values in this interval not displayed. ABG:  Lab Results   Component Value Date/Time    FIO2 INFORMATION NOT PROVIDED 12/23/2022 09:19 PM     Lab Results   Component Value Date/Time    SPECIAL UNKNOWN 12/23/2022 09:10 PM     Lab Results   Component Value Date/Time    CULTURE NO GROWTH 2 DAYS 12/23/2022 09:15 PM       Radiology:  CT HEAD WO CONTRAST    Result Date: 12/23/2022  No acute intracranial abnormality. No acute territorial infarction, intracranial hemorrhage or mass lesion. Status post right canal wall down mastoidectomy with soft tissue density within the mastoidectomy bowl. XR CHEST PORTABLE    Result Date: 12/24/2022  Perihilar consolidations with interval worsening. XR CHEST PORTABLE    Result Date: 12/23/2022  Increased lung markings bilaterally, may be related to mild pulmonary vascular congestion versus bronchitis.        Physical Examination:        General appearance:  alert, cooperative and no distress  Mental Status:  oriented to person, place and time and normal affect  Lungs:  course to auscultation bilaterally with rhonchi, on two liters equal chest rise, normal effort  Heart:  regular rate and rhythm, no murmur  Abdomen:  soft, nontender, nondistended, normal bowel sounds, no masses, hepatomegaly, splenomegaly  Extremities:  no edema, redness, tenderness in the calves  Skin:  no gross lesions, rashes, induration    Assessment:        Hospital Problems             Last Modified POA    * (Principal) Aspiration pneumonia due to gastric secretions ([facility] 12/26/2022 Yes    COPD with acute exacerbation ([facility] 12/26/2022 Yes    Elevated troponin level not due myocardial infarction 12/26/2022 Yes    Toxic metabolic encephalopathy 12/33/0858 Yes    Hypoglycemia 12/25/2022 Yes       Plan:        Aspiration pneumonia: lungs still sound course to ausculation. Will continue Unasyn, Azithromycin and steroids. Repeat CXR today  Acute COPD exacerbation: continue mucolytic's, steroids, breathing treatments. Non MI troponin elevation: due to hypoglycemia, hypoxia. Cardiology signed off, neds to follow up in clinic in two weeks time  Diabetes mellitus with hyperglycemia: Start lantus basal/bolus regimen. Can restart home insulin pump on discharge. TME due to hypoglycemia: Resolved, pt mentation back to baseline  PTOT  DVT ppx  Labs, imaging reviewed       Dispo: Plan for d/c in 24 hours.    Darling Tejeda DO  12/26/2022  12:16 PM

## 2022-12-26 NOTE — PLAN OF CARE
Problem: Discharge Planning  Goal: Discharge to home or other facility with appropriate resources  12/26/2022 1814 by Martha Simon RN  Outcome: Progressing  12/26/2022 0431 by Fredrick Thurman RN  Outcome: Progressing     Problem: Pain  Goal: Verbalizes/displays adequate comfort level or baseline comfort level  12/26/2022 1814 by Martha Simon RN  Outcome: Progressing  12/26/2022 0431 by Fredrick Thurman RN  Outcome: Progressing     Problem: Respiratory - Adult  Goal: Achieves optimal ventilation and oxygenation  12/26/2022 1814 by Martha Simon RN  Outcome: Progressing  12/26/2022 0847 by Amor Eisenberg RCP  Outcome: Progressing  12/26/2022 0431 by Fredrick Thurman RN  Outcome: Progressing     Problem: Cardiovascular - Adult  Goal: Maintains optimal cardiac output and hemodynamic stability  12/26/2022 1814 by Martha Simon RN  Outcome: Progressing  12/26/2022 0431 by Fredrick Thurman RN  Outcome: Progressing  Goal: Absence of cardiac dysrhythmias or at baseline  12/26/2022 1814 by Martha Simon RN  Outcome: Progressing  12/26/2022 0431 by Fredrick Thurman RN  Outcome: Progressing     Problem: Skin/Tissue Integrity - Adult  Goal: Skin integrity remains intact  12/26/2022 1814 by Martha Simon RN  Outcome: Progressing  12/26/2022 0431 by Fredrick Thurman RN  Outcome: Progressing  Flowsheets (Taken 12/25/2022 2200)  Skin Integrity Remains Intact: Monitor for areas of redness and/or skin breakdown  Goal: Incisions, wounds, or drain sites healing without S/S of infection  12/26/2022 1814 by Martha Simon RN  Outcome: Progressing  12/26/2022 0431 by Fredrick Thurman RN  Outcome: Progressing  Goal: Oral mucous membranes remain intact  12/26/2022 1814 by Martha Simon RN  Outcome: Progressing  12/26/2022 0431 by Fredrick Thurman RN  Outcome: Progressing     Problem: Musculoskeletal - Adult  Goal: Return mobility to safest level of function  12/26/2022 1814 by Stephanie Aaron RN  Outcome: Progressing  12/26/2022 0431 by Lo Pederson RN  Outcome: Progressing  Goal: Maintain proper alignment of affected body part  12/26/2022 1814 by Stephanie Aaron RN  Outcome: Progressing  12/26/2022 0431 by Lo Pederson RN  Outcome: Progressing  Goal: Return ADL status to a safe level of function  12/26/2022 1814 by Stephanie Aaron RN  Outcome: Progressing  12/26/2022 0431 by Lo Pederson RN  Outcome: Progressing     Problem: Infection - Adult  Goal: Absence of infection at discharge  12/26/2022 1814 by Stephanie Aaron RN  Outcome: Progressing  12/26/2022 0431 by Lo Pederson RN  Outcome: Progressing  Goal: Absence of infection during hospitalization  12/26/2022 1814 by Stephanie Aaron RN  Outcome: Progressing  12/26/2022 0431 by Lo Pederson RN  Outcome: Progressing  Goal: Absence of fever/infection during anticipated neutropenic period  12/26/2022 1814 by Stephanie Aaron RN  Outcome: Progressing  12/26/2022 0431 by Lo Pederson RN  Outcome: Progressing     Problem: Hematologic - Adult  Goal: Maintains hematologic stability  12/26/2022 1814 by Stephanie Aaron RN  Outcome: Progressing  12/26/2022 0431 by Lo Pederson RN  Outcome: Progressing     Problem: Skin/Tissue Integrity  Goal: Absence of new skin breakdown  Description: 1. Monitor for areas of redness and/or skin breakdown  2. Assess vascular access sites hourly  3. Every 4-6 hours minimum:  Change oxygen saturation probe site  4. Every 4-6 hours:  If on nasal continuous positive airway pressure, respiratory therapy assess nares and determine need for appliance change or resting period.   12/26/2022 1814 by Stephanie Aaron RN  Outcome: Progressing  12/26/2022 0431 by Lo Pederson RN  Outcome: Progressing     Problem: Chronic Conditions and Co-morbidities  Goal: Patient's chronic conditions and co-morbidity symptoms are monitored and maintained or improved  12/26/2022 1814 by Kassi Barber RN  Outcome: Progressing  12/26/2022 0431 by Alfredo Jamil RN  Outcome: Progressing     Problem: Safety - Adult  Goal: Free from fall injury  12/26/2022 1814 by Kassi Barber RN  Outcome: Progressing  12/26/2022 0431 by Alfredo Jamil RN  Outcome: Progressing  Flowsheets (Taken 12/25/2022 2200)  Free From Fall Injury: Eveline Mock family/caregiver on patient safety     Problem: ABCDS Injury Assessment  Goal: Absence of physical injury  12/26/2022 1814 by Kassi Barber RN  Outcome: Progressing  12/26/2022 0431 by Alfredo Jamil RN  Outcome: Progressing  Flowsheets (Taken 12/25/2022 2200)  Absence of Physical Injury: Implement safety measures based on patient assessment

## 2022-12-26 NOTE — PROGRESS NOTES
[facility]  Progress Note                   Date:   12/26/2022  Patient name: Cathy Tanner  Date of admission:  12/23/2022  8:05 PM  MRN:   [id number]  YOB: 1955  PCP: No primary care provider on file. Reason for Admission: Hypoglycemia [E16.2]  Opioid overdose, undetermined intent, initial encounter ([facility] [T40.2X4A]  Altered mental status, unspecified altered mental status type [R41.82]  AMS (altered mental status) [R41.82]    Subjective:       Clinical Changes /Abnormalities:Seen & examined in room. No acute CV issues/concerns overnight. Labs, vitals, & tele reviewed. Denies any CP.   Echo pending    Review of Systems    Medications:   Scheduled Meds:   ipratropium-albuterol  1 ampule Inhalation 4x daily    budesonide-formoterol  2 puff Inhalation BID    azithromycin  500 mg IntraVENous Q24H    predniSONE  40 mg Oral Daily    sodium chloride flush  5-40 mL IntraVENous 2 times per day    aspirin  81 mg Oral Daily    atorvastatin  40 mg Oral Nightly    ampicillin-sulbactam  3,000 mg IntraVENous Q6H    insulin lispro  0-8 Units SubCUTAneous TID WC    insulin lispro  0-4 Units SubCUTAneous Nightly     Continuous Infusions:   sodium chloride 10 mL/hr at 12/24/22 1847    heparin (PORCINE) Infusion 18 Units/kg/hr (12/26/22 1131)    dextrose       CBC:   Recent Labs     12/24/22  0525 12/25/22  0455 12/26/22  0942   WBC 8.6 8.3 7.8   HGB 11.8* 11.1* 12.1    See Reflexed IPF Result 120*       BMP:    Recent Labs     12/24/22  0525 12/25/22  0455 12/26/22  0942    133* 137   K 4.6 4.4 4.5   * 100 103   CO2 24 24 24   BUN 27* 29* 20   CREATININE 0.67 0.72 0.67   GLUCOSE 109* 225* 272*       Hepatic:  Recent Labs     12/23/22  2119 12/24/22  0525   AST 22 25   ALT 19 15   BILITOT 0.2* 0.3   ALKPHOS 152* 123*       Troponin:   Recent Labs     12/25/22  0455 12/25/22  1231 12/25/22  1602   TROPHS 111* 81* 82*       BNP: No results for input(s): BNP in the last 72 hours. Lipids: No results for input(s): CHOL, HDL in the last 72 hours. Invalid input(s): LDLCALCU  INR: No results for input(s): INR in the last 72 hours. Objective:   Vitals: BP (!) 153/58   Pulse 77   Temp 97.4 °F (36.3 °C) (Oral)   Resp 17   Ht 5' (1.524 m)   Wt 145 lb (65.8 kg)   SpO2 100%   BMI 28.32 kg/m²   General appearance: alert and cooperative with exam  HEENT: Head: Normocephalic, no lesions, without obvious abnormality. Neck:no JVD, trachea midline, no adenopathy  Lungs: Clear but dim throughout to auscultation  Heart: Regular rate and rhythm, s1/s2 auscultated, no murmurs, SR 92  Abdomen: soft, non-tender, bowel sounds active  Extremities: no edema  Neurologic: not done    Echo 12/24/22  Summary  Left ventricle is normal in size. Global left ventricular systolic function  is normal. Estimated ejection fraction is 50-55 %. Apical hypokinesis noted. Normal right ventricle size and function. Aortic valve is sclerotic but opens well. Aortic valve is trileaflet. Mild to moderate aortic insufficiency. No aortic stenosis. Assessment / Acute Cardiac Problems:   NSTEMI - type I vs type II  Toxic metabolic encephalopathy  Aspiration PNA  COPD exacerbation  HTN  GERD  DM2    Patient Active Problem List:     AMS (altered mental status)     NSTEMI (non-ST elevated myocardial infarction) ([facility]     Toxic metabolic encephalopathy      Plan of Treatment:   Stable. Denies any CP. Trop trending down, likely type II. No ischemic changes on ECG and remains free of pain. Echo as above. Recommend continued med management with OP f/u for consideration of stress testing after acute issues resolve. OK to d/c heparin gtt. Continue PO ASA & statin. Will add low dose BB. ATB for PNA along with COPD tx  per primary  Keep K >4 and Mg >2  Will sing off. Please call with questions/concerns. F/U in clinic in 2 weeks.      Electronically signed by Keshawn Adkins, APRN - CNP on 12/26/2022 at 11:49 AM  [facility] [address] [place] [address].  [phone]

## 2022-12-26 NOTE — PLAN OF CARE
Problem: Discharge Planning  Goal: Discharge to home or other facility with appropriate resources  12/26/2022 0431 by Anne Marie Finney RN  Outcome: Progressing  12/25/2022 1805 by Steph South RN  Outcome: Progressing     Problem: Pain  Goal: Verbalizes/displays adequate comfort level or baseline comfort level  12/26/2022 0431 by Anne Marie Finney RN  Outcome: Progressing  12/25/2022 1805 by Steph South RN  Outcome: Progressing     Problem: Respiratory - Adult  Goal: Achieves optimal ventilation and oxygenation  12/26/2022 0431 by Anne Marie Finney RN  Outcome: Progressing  12/25/2022 1805 by Steph South RN  Outcome: Progressing     Problem: Cardiovascular - Adult  Goal: Maintains optimal cardiac output and hemodynamic stability  12/26/2022 0431 by Anne Marie Finney RN  Outcome: Progressing  12/25/2022 1805 by Steph South RN  Outcome: Progressing  Goal: Absence of cardiac dysrhythmias or at baseline  12/26/2022 0431 by Anne Marie Finney RN  Outcome: Progressing  12/25/2022 1805 by Steph South RN  Outcome: Progressing     Problem: Skin/Tissue Integrity - Adult  Goal: Skin integrity remains intact  12/26/2022 0431 by Anne Marie Finney RN  Outcome: Progressing  Flowsheets (Taken 12/25/2022 2200)  Skin Integrity Remains Intact: Monitor for areas of redness and/or skin breakdown  12/25/2022 1805 by Steph South RN  Outcome: Progressing  Goal: Incisions, wounds, or drain sites healing without S/S of infection  12/26/2022 0431 by Anne Marie Finney, RN  Outcome: Progressing  12/25/2022 1805 by Steph South RN  Outcome: Progressing  Goal: Oral mucous membranes remain intact  12/26/2022 0431 by Anne Marie Finney RN  Outcome: Progressing  12/25/2022 1805 by Steph South RN  Outcome: Progressing     Problem: Musculoskeletal - Adult  Goal: Return mobility to safest level of function  12/26/2022 0431 by Deep King RN  Outcome: Progressing  12/25/2022 1805 by Piper Nuñez RN  Outcome: Progressing  Goal: Maintain proper alignment of affected body part  12/26/2022 0431 by Deep King RN  Outcome: Progressing  12/25/2022 1805 by Piper Nuñez RN  Outcome: Progressing  Goal: Return ADL status to a safe level of function  12/26/2022 0431 by Deep King RN  Outcome: Progressing  12/25/2022 1805 by Piper Nuñez RN  Outcome: Progressing     Problem: Infection - Adult  Goal: Absence of infection at discharge  12/26/2022 0431 by Deep King RN  Outcome: Progressing  12/25/2022 1805 by Piper Nuñez RN  Outcome: Progressing  Goal: Absence of infection during hospitalization  12/26/2022 0431 by Deep King RN  Outcome: Progressing  12/25/2022 1805 by Piper Nuñez RN  Outcome: Progressing  Goal: Absence of fever/infection during anticipated neutropenic period  12/26/2022 0431 by Deep King RN  Outcome: Progressing  12/25/2022 1805 by Piper Nuñez RN  Outcome: Progressing     Problem: Hematologic - Adult  Goal: Maintains hematologic stability  12/26/2022 0431 by Deep King RN  Outcome: Progressing  12/25/2022 1805 by Piper Nuñez RN  Outcome: Progressing     Problem: Skin/Tissue Integrity  Goal: Absence of new skin breakdown  Description: 1. Monitor for areas of redness and/or skin breakdown  2. Assess vascular access sites hourly  3. Every 4-6 hours minimum:  Change oxygen saturation probe site  4. Every 4-6 hours:  If on nasal continuous positive airway pressure, respiratory therapy assess nares and determine need for appliance change or resting period.   12/26/2022 0431 by Deep King RN  Outcome: Progressing  12/25/2022 1805 by Piper Nuñez RN  Outcome: Progressing     Problem: Chronic Conditions and Co-morbidities  Goal: Patient's chronic conditions and co-morbidity symptoms are monitored and maintained or improved  12/26/2022 0431 by Jung Sanchez RN  Outcome: Progressing  12/25/2022 1805 by Mazin Hayward RN  Outcome: Progressing     Problem: Safety - Adult  Goal: Free from fall injury  12/26/2022 0431 by Jung Sanchez RN  Outcome: Progressing  Flowsheets (Taken 12/25/2022 2200)  Free From Fall Injury: Instruct family/caregiver on patient safety  12/25/2022 1805 by Mazin Hayward RN  Outcome: Progressing     Problem: ABCDS Injury Assessment  Goal: Absence of physical injury  12/26/2022 0431 by Jung Sanchez RN  Outcome: Progressing  Flowsheets (Taken 12/25/2022 2200)  Absence of Physical Injury: Implement safety measures based on patient assessment  12/25/2022 1805 by Mazin Hayward RN  Outcome: Progressing

## 2022-12-26 NOTE — PROGRESS NOTES
Physical Therapy  Facility/Department: [facility] ONC/MED SURG  Daily treatment note    Name: Verena Krabbe  : 1955  MRN: [id number]  Date of Service: 2022    Discharge Recommendations:  Patient would benefit from continued therapy after discharge          Patient Diagnosis(es): The primary encounter diagnosis was Altered mental status, unspecified altered mental status type. Diagnoses of Hypoglycemia and Opioid overdose, undetermined intent, initial encounter [facility]) were also pertinent to this visit. Past Medical History:  has a past medical history of CHF (congestive heart failure) ([facility] COPD (chronic obstructive pulmonary disease) ([facility] [address] [facility] DMII (diabetes mellitus, type 2) ([facility] GERD (gastroesophageal reflux disease), HTN (hypertension), and Opioid overdose ([facility] Past Surgical History:  has a past surgical history that includes Eye surgery and Colonoscopy. Assessment   Body Structures, Functions, Activity Limitations Requiring Skilled Therapeutic Intervention: Decreased functional mobility ; Decreased strength;Decreased safe awareness;Decreased cognition;Decreased endurance;Decreased balance  Assessment: The pt able to ambulate 70ft x2 with RW requiring MIN-CGA for safety, fatigues quickly requiring multiple rest breaks. Pt demo poor safety awareness making her a fall risk.  Recomending continued therapy to address deficits  Therapy Prognosis: Good  Activity Tolerance  Activity Tolerance: Patient limited by fatigue;Patient limited by endurance     Plan   Physcial Therapy Plan  General Plan: 5-7 times per week  Specific Instructions for Next Treatment: balance; gait  Current Treatment Recommendations: Strengthening, Balance training, Functional mobility training, Gait training, Stair training, Endurance training, Cognitive/Perceptual training, Equipment evaluation, education, & procurement, Home exercise program, Safety education & training, Patient/Caregiver education & training, Therapeutic activities  Safety Devices  Type of Devices: Call light within reach, Patient at risk for falls, Gait belt, Left in chair, Nurse notified  Restraints  Restraints Initially in Place: No     Restrictions  Restrictions/Precautions  Restrictions/Precautions: Up as Tolerated  Position Activity Restriction  Other position/activity restrictions: up with assist     Subjective   General  Patient assessed for rehabilitation services?: Yes  Response To Previous Treatment: Patient with no complaints from previous session. Family / Caregiver Present: No  Follows Commands: Within Functional Limits  Subjective  Subjective: RN and pt agreeable to PT. Pt very motivated to ambulate with therapy this PM, denies pain, Pleasant and cooperative t/o            Cognition   Orientation  Overall Orientation Status: Within Functional Limits  Orientation Level: Oriented to person;Oriented to time;Oriented to situation;Oriented to place  Cognition  Following Commands: Follows one step commands with repetition  Insights: Decreased awareness of deficits  Initiation: Requires cues for some  Sequencing: Does not require cues     Objective      Bed Mobility Training  Bed Mobility Training: No (up in chair at start and end of session)  Balance  Sitting: Without support (SBA edge of recliner)  Standing: With support (CGA w/RW)  Transfer Training  Transfer Training: Yes  Overall Level of Assistance: Contact-guard assistance; Adaptive equipment  Sit to Stand: Contact-guard assistance; Adaptive equipment  Stand to Sit: Contact-guard assistance; Adaptive equipment  Gait  Overall Level of Assistance: Contact-guard assistance; Adaptive equipment (cues for RW management;  Fair return)  Assistive Device: Walker, rolling;Gait belt  Bed mobility  Rolling to Right: Independent  Supine to Sit: Independent  Sit to Supine: Independent  Transfers  Sit to Stand: Stand by assistance  Stand to Sit: Stand by assistance  Bed to Chair: Stand by assistance  Comment: Transfer without ad  Ambulation  Surface: Level tile  Device: Rolling Walker  Assistance: Minimal assistance  Quality of Gait: fatigues quickly , poor safety awreness,  Gait Deviations: Increased IZABELLA;Shuffles  Distance: 70ft x2,  Comments: limited by fatigue and decrease endurance, pt lacks insight to deficits making her a fall risk     Balance  Posture: Good  Sitting - Static: Good  Sitting - Dynamic: Good  Standing - Static: Fair;+  Standing - Dynamic: Fair  Exercise Treatment: Standing exercise program: Heel/toe raises, hip flexion, hip abduction, mini squats, hip extension, and hamstring curls. Reps: 5-8, with Use of RW for stanility and balance. Rest as needed      AM-PAC Score  AM-PAC Inpatient Mobility Raw Score : 17 (12/26/22 1627)  AM-PAC Inpatient T-Scale Score : 42.13 (12/26/22 1627)  Mobility Inpatient CMS 0-100% Score: 50.57 (12/26/22 1627)  Mobility Inpatient CMS G-Code Modifier : CK (12/26/22 1627)   Goals  Short Term Goals  Time Frame for Short Term Goals: 12  Short Term Goal 1: Transfers independent  Short Term Goal 2: Amb x 150 ft w/ r.walker independently  Short Term Goal 3: Pt oriented x 4 with good awareness of safety with mobility. Short Term Goal 4: Pt able to ascend/ descend 4 steps with one rail  Short Term Goal 5: Pt issued HEP and ind w/ exercises. Patient Goals   Patient Goals : Pt goal is to be safe prior to d/c home. Pt without doesn't remember much about how she got here       Education  Patient Education  Education Given To: Patient  Education Provided: Role of Therapy;Transfer Training; Fall Prevention Strategies  Education Method: Demonstration;Verbal  Barriers to Learning: Cognition  Education Outcome: Continued education needed      Therapy Time   Individual Concurrent Group Co-treatment   Time In 1450         Time Out 3437         Minutes 28         Timed Code Treatment Minutes: Alejandro Morales PTA

## 2022-12-27 VITALS
WEIGHT: 145 LBS | SYSTOLIC BLOOD PRESSURE: 142 MMHG | TEMPERATURE: 97.9 F | HEIGHT: 60 IN | OXYGEN SATURATION: 100 % | BODY MASS INDEX: 28.47 KG/M2 | RESPIRATION RATE: 18 BRPM | HEART RATE: 77 BPM | DIASTOLIC BLOOD PRESSURE: 63 MMHG

## 2022-12-27 LAB
GLUCOSE BLD-MCNC: 152 MG/DL (ref 65–105)
GLUCOSE BLD-MCNC: 195 MG/DL (ref 65–105)
GLUCOSE BLD-MCNC: 215 MG/DL (ref 65–105)

## 2022-12-27 PROCEDURE — 6360000002 HC RX W HCPCS

## 2022-12-27 PROCEDURE — 2580000003 HC RX 258: Performed by: INTERNAL MEDICINE

## 2022-12-27 PROCEDURE — 6370000000 HC RX 637 (ALT 250 FOR IP): Performed by: INTERNAL MEDICINE

## 2022-12-27 PROCEDURE — 99232 SBSQ HOSP IP/OBS MODERATE 35: CPT | Performed by: INTERNAL MEDICINE

## 2022-12-27 PROCEDURE — 2580000003 HC RX 258

## 2022-12-27 PROCEDURE — 6370000000 HC RX 637 (ALT 250 FOR IP)

## 2022-12-27 PROCEDURE — 2700000000 HC OXYGEN THERAPY PER DAY

## 2022-12-27 PROCEDURE — 2580000003 HC RX 258: Performed by: STUDENT IN AN ORGANIZED HEALTH CARE EDUCATION/TRAINING PROGRAM

## 2022-12-27 PROCEDURE — 94640 AIRWAY INHALATION TREATMENT: CPT

## 2022-12-27 PROCEDURE — 82947 ASSAY GLUCOSE BLOOD QUANT: CPT

## 2022-12-27 PROCEDURE — 6360000002 HC RX W HCPCS: Performed by: INTERNAL MEDICINE

## 2022-12-27 PROCEDURE — 99238 HOSP IP/OBS DSCHRG MGMT 30/<: CPT | Performed by: STUDENT IN AN ORGANIZED HEALTH CARE EDUCATION/TRAINING PROGRAM

## 2022-12-27 PROCEDURE — 6370000000 HC RX 637 (ALT 250 FOR IP): Performed by: NURSE PRACTITIONER

## 2022-12-27 RX ORDER — ASPIRIN 81 MG/1
81 TABLET, CHEWABLE ORAL DAILY
Qty: 30 TABLET | Refills: 3 | Status: SHIPPED | OUTPATIENT
Start: 2022-12-28

## 2022-12-27 RX ORDER — AMOXICILLIN AND CLAVULANATE POTASSIUM 875; 125 MG/1; MG/1
1 TABLET, FILM COATED ORAL 2 TIMES DAILY
Qty: 6 TABLET | Refills: 0 | Status: SHIPPED | OUTPATIENT
Start: 2022-12-27 | End: 2022-12-30

## 2022-12-27 RX ORDER — AZITHROMYCIN 250 MG/1
250 TABLET, FILM COATED ORAL DAILY
Qty: 3 TABLET | Refills: 0 | Status: SHIPPED | OUTPATIENT
Start: 2022-12-27 | End: 2022-12-30

## 2022-12-27 RX ORDER — BUDESONIDE AND FORMOTEROL FUMARATE DIHYDRATE 160; 4.5 UG/1; UG/1
2 AEROSOL RESPIRATORY (INHALATION) 2 TIMES DAILY
Qty: 10.2 G | Refills: 3 | Status: SHIPPED | OUTPATIENT
Start: 2022-12-27

## 2022-12-27 RX ORDER — PREDNISONE 10 MG/1
30 TABLET ORAL DAILY
Qty: 6 TABLET | Refills: 0 | Status: SHIPPED | OUTPATIENT
Start: 2022-12-28 | End: 2022-12-30

## 2022-12-27 RX ADMIN — IPRATROPIUM BROMIDE AND ALBUTEROL SULFATE 1 AMPULE: .5; 3 SOLUTION RESPIRATORY (INHALATION) at 12:51

## 2022-12-27 RX ADMIN — PREDNISONE 30 MG: 20 TABLET ORAL at 08:49

## 2022-12-27 RX ADMIN — SODIUM CHLORIDE, PRESERVATIVE FREE 10 ML: 5 INJECTION INTRAVENOUS at 08:50

## 2022-12-27 RX ADMIN — Medication 500 MG: at 11:57

## 2022-12-27 RX ADMIN — INSULIN GLARGINE 12 UNITS: 100 INJECTION, SOLUTION SUBCUTANEOUS at 08:49

## 2022-12-27 RX ADMIN — BUDESONIDE AND FORMOTEROL FUMARATE DIHYDRATE 2 PUFF: 160; 4.5 AEROSOL RESPIRATORY (INHALATION) at 09:26

## 2022-12-27 RX ADMIN — IPRATROPIUM BROMIDE AND ALBUTEROL SULFATE 1 AMPULE: .5; 3 SOLUTION RESPIRATORY (INHALATION) at 09:26

## 2022-12-27 RX ADMIN — ASPIRIN 81 MG: 81 TABLET, CHEWABLE ORAL at 08:49

## 2022-12-27 RX ADMIN — AMPICILLIN SODIUM AND SULBACTAM SODIUM 3000 MG: 2; 1 INJECTION, POWDER, FOR SOLUTION INTRAMUSCULAR; INTRAVENOUS at 02:07

## 2022-12-27 RX ADMIN — AMPICILLIN SODIUM AND SULBACTAM SODIUM 3000 MG: 2; 1 INJECTION, POWDER, FOR SOLUTION INTRAMUSCULAR; INTRAVENOUS at 08:54

## 2022-12-27 RX ADMIN — CARVEDILOL 3.12 MG: 3.12 TABLET, FILM COATED ORAL at 08:49

## 2022-12-27 ASSESSMENT — ENCOUNTER SYMPTOMS
WHEEZING: 0
APNEA: 0
ABDOMINAL PAIN: 0
BACK PAIN: 0
VOMITING: 0
ABDOMINAL DISTENTION: 0
EYE DISCHARGE: 0
FACIAL SWELLING: 0
CONSTIPATION: 0
SORE THROAT: 0
EYE ITCHING: 0
TROUBLE SWALLOWING: 0
ALLERGIC/IMMUNOLOGIC NEGATIVE: 1
COUGH: 1
DIARRHEA: 0
CHEST TIGHTNESS: 0
SHORTNESS OF BREATH: 0
EYE REDNESS: 0
PHOTOPHOBIA: 0
COLOR CHANGE: 0

## 2022-12-27 NOTE — PROGRESS NOTES
PULMONARY & CRITICAL CARE MEDICINE PROGRESS  NOTE     Patient:  Cathy Tanner  MRN: [id number]  Admit date: 12/23/2022  Primary Care Physician: No primary care provider on file. Consulting Physician: Oscar Pinto MD  CODE Status: Full Code  LOS: 4    SUBJECTIVE     I personally interviewed/examined the patient, reviewed interval history and interpreted all available radiographic, laboratory data at the time of service. Chief Compliant/Reason for Initial Consult:   Acute hypoxic hypercapnic respiratory failure/altered mental status    Brief Hospital Course: The patient is a 78 y.o. female she has long history of smoking currently smoker history of COPD on home oxygen was admitted to medical ICU when she presented on 12/23/2022 apparently with altered mental status initial thought with patient had hypoglycemia and apparently accidentally took more insulin from her pump and had altered mental status patient was admitted to ICU for observation she was never intubated but was on high oxygen and hypoventilation. Initial blood gas was 7.29 PCO2 was 59 and PO2 was 58 apparently was a venous blood gas her respiratory panel was negative. Urine drug screen was negative. Troponin was elevated and was started on heparin drip cardiology was consulted. Chest x-ray initially shows mild perihilar congestion/consolidation and she was treated for aspiration with Unasyn in the ICU. Patient mentation improved and she was transferred out of ICU on 12/24/2022. Interval History:  12/27/22  No acute events     Review of Systems:  Review of Systems   Constitutional:  Negative for fatigue and fever. HENT:  Negative for postnasal drip, sore throat and trouble swallowing. Eyes:  Negative for photophobia, redness and visual disturbance. Respiratory:  Positive for cough. Negative for shortness of breath and wheezing.     Cardiovascular:  Negative for chest pain, palpitations and leg swelling. Gastrointestinal:  Negative for abdominal pain, diarrhea and vomiting. Endocrine: Negative for polydipsia, polyphagia and polyuria. Genitourinary:  Negative for dysuria, frequency and hematuria. Musculoskeletal: Negative. Skin: Negative. Allergic/Immunologic: Negative. Neurological:  Negative for dizziness, seizures, syncope, speech difficulty and weakness. Hematological:  Negative for adenopathy. Does not bruise/bleed easily. Psychiatric/Behavioral: Negative. OBJECTIVE     VITAL SIGNS:   LAST-  BP (!) 142/63   Pulse 77   Temp 97.9 °F (36.6 °C) (Oral)   Resp 18   Ht 5' (1.524 m)   Wt 145 lb (65.8 kg)   SpO2 99%   BMI 28.32 kg/m²   8-24 HR RANGE-  TEMP Temp  Av °F (36.7 °C)  Min: 97.9 °F (36.6 °C)  Max: 98.1 °F (23.3 °C)   BP Systolic (75OMX), TWU:564 , Min:110 , LMR:395      Diastolic (40GVP), MARIANNE:79, Min:49, Max:63     PULSE Pulse  Av  Min: 77  Max: 92   RR Resp  Av  Min: 18  Max: 18   O2 SAT SpO2  Av %  Min: 99 %  Max: 99 %   OXYGEN DELIVERY O2 Flow Rate (L/min)  Av L/min  Min: 2 L/min  Max: 2 L/min     Systemic Examination:   Physical Exam  Head and neck atraumatic, normocephalic    Lymph nodes-no cervical, supraclavicular lymphadenopathy    Neck-no JVP elevation    Lungs - AP diameter of chest increased. Thoracic expansion and diaphragmatic excursion diminished. BS diminished and expiratory phase prolonged. No dullness to percussion or tenderness to palpation. No bronchial breath sounds . Exp rhonchi   CVS- S1, S2 regular. No S3 no S4, no murmurs    Abdomen-nontender, nondistended. Bowel sounds are present. No organomegaly    Lower extremity-no edema    Upper extremity-no edema    Neurological-grossly normal cranial nerves.   No overt motor deficit      DATA REVIEW     Medications:  Scheduled Meds:   carvedilol  3.125 mg Oral BID WC    insulin glargine  12 Units SubCUTAneous Daily    insulin lispro  0-8 Units SubCUTAneous TID     insulin lispro  0-4 Units SubCUTAneous Nightly    predniSONE  30 mg Oral Daily    ipratropium-albuterol  1 ampule Inhalation 4x daily    budesonide-formoterol  2 puff Inhalation BID    azithromycin  500 mg IntraVENous Q24H    sodium chloride flush  5-40 mL IntraVENous 2 times per day    aspirin  81 mg Oral Daily    atorvastatin  40 mg Oral Nightly    ampicillin-sulbactam  3,000 mg IntraVENous Q6H     Continuous Infusions:   sodium chloride 10 mL/hr at 12/24/22 1847    dextrose       LABS:-  ABG:   No results for input(s): POCPH, POCPCO2, POCPO2, POCHCO3, LBOK1FWM in the last 72 hours. CBC:   Recent Labs     12/25/22  0455 12/26/22  0942   WBC 8.3 7.8   HGB 11.1* 12.1   HCT 37.8 40.7   .2 102.5   PLT See Reflexed IPF Result 120*   LYMPHOPCT 19* 22*   RBC 3.70* 3.97   MCH 30.0 30.5   MCHC 29.4 29.7   RDW 14.6* 14.3       BMP:   Recent Labs     12/25/22  0455 12/26/22  0942   * 137   K 4.4 4.5    103   CO2 24 24   BUN 29* 20   CREATININE 0.72 0.67   GLUCOSE 225* 272*       Liver Function Test:   No results for input(s): PROT, LABALBU, ALT, AST, GGT, ALKPHOS, BILITOT in the last 72 hours. Amylase/Lipase:  No results for input(s): AMYLASE, LIPASE in the last 72 hours. Coagulation Profile:   Recent Labs     12/25/22  1256 12/25/22  1927 12/26/22  0942   APTT 60.0* 58.2* 34.3*       Cardiac Enzymes:  No results for input(s): CKTOTAL, CKMB, CKMBINDEX, TROPONINI in the last 72 hours.     Lactic Acid:  No results found for: LACTA  BNP:   No results found for: BNP  D-Dimer:  No results found for: DDIMER  Others:   Lab Results   Component Value Date    TSH 2.62 12/23/2022     No results found for: BEE, RHEUMFACTOR, SEDRATE, CRP  No results found for: Kelly Pink  No results found for: IRON, TIBC, FERRITIN  No results found for: SPEP, UPEP  No results found for: PSA, CEA, , YV1519,     Input/Output:    Intake/Output Summary (Last 24 hours) at 12/27/2022 1253  Last data filed at 12/27/2022 1205  Gross per 24 hour   Intake --   Output 1500 ml   Net -1500 ml         Microbiology:  No results for input(s): SPECDESC, SPECDESC, SPECIAL, CULTURE, CULTURE, STATUS, ORG, CDIFFTOXPCR, CAMPYLOBPCR, SALMONELLAPC, SHIGAPCR, SHIGELLAPCR, MPNEUG, MPNEUM, LACTOQL in the last 72 hours. Pathology:    Radiology reports:  XR CHEST PORTABLE   Final Result   Resolving pulmonary vascular congestion         XR CHEST PORTABLE   Final Result   Perihilar consolidations with interval worsening. CT HEAD WO CONTRAST   Final Result   No acute intracranial abnormality. No acute territorial infarction,   intracranial hemorrhage or mass lesion. Status post right canal wall down mastoidectomy with soft tissue density   within the mastoidectomy bowl. XR CHEST PORTABLE   Final Result   Increased lung markings bilaterally, may be related to mild pulmonary   vascular congestion versus bronchitis. Echocardiogram:   No results found for this or any previous visit. Cardiac Catheterization:   No results found for this or any previous visit. ASSESSMENT AND PLAN     Assessment:    //Acute hypoxic hypercapnic respiratory failure improved   //Chronic hypoxic respiratory failure  //Bilateral pulmonary congestion/infrahilar interstitial infiltrate better   //Bronchiolitis/bronchopneumonia  //COPD exacerbation  //Non-ST elevation MI  //COPD on home oxygen severity not known  //Diabetes mellitus    Plan:      Complete azithromycin for 5 days - can change to po   Can change to Augmentin po for total 7 days   Cont Symbicort and duo neb   Dec prednisone to 30 mg for 5 days  Can dc from pulmonary standpoint        Felisa Arvizu MD, M.D. Pulmonary and Critical Care Medicine           12/27/2022, 12:53 PM    Please note that this chart was generated using voice recognition Dragon dictation software.   Although every effort was made to ensure the accuracy of this automated transcription, some errors in transcription may have occurred.

## 2022-12-27 NOTE — CARE COORDINATION
Transitional Planning  Spoke with patient regarding home care, patient declining at this time, states she lives with her sister and she will always be there and does not feel she need skilled therapy.   Has transportation
Transitional Planning:  Spoke with pt re: transition plan. Provided [facility] [place]. list.  She will make 3 choices. States she was on home O2. Not sure of company.
discuss the discharge plan with any other family members/significant others, and if so, who? Yes  Plans to Return to Present Housing: Unknown at present  Other Identified Issues/Barriers to RETURNING to current housing:   Potential Assistance needed at discharge: N/A            Potential DME:    Patient expects to discharge to:    Plan for transportation at discharge:      Financial    Payor: [employer] / Plan: [employer] / Product Type: *No Product type* /     Does insurance require precert for SNF: Yes    Potential assistance Purchasing Medications: No  Meds-to-Beds request:      No Pharmacies Listed    Notes:    Factors facilitating achievement of predicted outcomes: Family support    Barriers to discharge: Pain    Additional Case Management Notes: The Plan for Transition of Care is related to the following treatment goals of Hypoglycemia [E16.2]  Opioid [facility], undetermined intent, [facility] [T40.2X4A]  Altered mental status, unspecified altered mental status type [R41.82]  AMS (altered mental status) [E26.07]    IF APPLICABLE: The Patient and/or patient representative Vicenta Garcia and her family were provided with a choice of provider and agrees with the discharge plan. Freedom of choice list with basic dialogue that supports the patient's individualized plan of care/goals and shares the quality data associated with the providers was provided to:     Patient Representative Name:       The Patient and/or Patient Representative Agree with the Discharge Plan? Pt state  She lives with her sister who drives her around and supports her. Her son, supports her, but does not drive.      Rosa Maria Arnold RN  Case Management Department  Ph: [phone] Fax:

## 2022-12-27 NOTE — PROGRESS NOTES
PULMONARY & CRITICAL CARE MEDICINE PROGRESS  NOTE     Patient:  [facility]  MRN: [id number]  Admit date: 12/23/2022  Primary Care Physician: No primary care provider on file. Consulting Physician: Azalea Sullivan DO  CODE Status: Full Code  LOS: 3    SUBJECTIVE     I personally interviewed/examined the patient, reviewed interval history and interpreted all available radiographic, laboratory data at the time of service. Chief Compliant/Reason for Initial Consult:   Acute hypoxic hypercapnic respiratory failure/altered mental status    Brief Hospital Course: The patient is a 78 y.o. female she has long history of smoking currently smoker history of COPD on home oxygen was admitted to medical ICU when she presented on 12/23/2022 apparently with altered mental status initial thought with patient had hypoglycemia and apparently accidentally took more insulin from her pump and had altered mental status patient was admitted to ICU for observation she was never intubated but was on high oxygen and hypoventilation. Initial blood gas was 7.29 PCO2 was 59 and PO2 was 58 apparently was a venous blood gas her respiratory panel was negative. Urine drug screen was negative. Troponin was elevated and was started on heparin drip cardiology was consulted. Chest x-ray initially shows mild perihilar congestion/consolidation and she was treated for aspiration with Unasyn in the ICU. Patient mentation improved and she was transferred out of ICU on 12/24/2022. Interval History:  12/26/22  No acute events     Review of Systems:  Review of Systems   Constitutional:  Negative for fatigue and fever. HENT:  Negative for postnasal drip, sore throat and trouble swallowing. Eyes:  Negative for photophobia, redness and visual disturbance. Respiratory:  Positive for cough. Negative for shortness of breath and wheezing.     Cardiovascular:  Negative for chest pain, palpitations and leg swelling. Gastrointestinal:  Negative for abdominal pain, diarrhea and vomiting. Endocrine: Negative for polydipsia, polyphagia and polyuria. Genitourinary:  Negative for dysuria, frequency and hematuria. Musculoskeletal: Negative. Skin: Negative. Allergic/Immunologic: Negative. Neurological:  Negative for dizziness, seizures, syncope, speech difficulty and weakness. Hematological:  Negative for adenopathy. Does not bruise/bleed easily. Psychiatric/Behavioral: Negative. OBJECTIVE     VITAL SIGNS:   LAST-  BP (!) 110/49   Pulse 81   Temp 97.4 °F (36.3 °C) (Oral)   Resp 17   Ht 5' (1.524 m)   Wt 145 lb (65.8 kg)   SpO2 100%   BMI 28.32 kg/m²   8-24 HR RANGE-  TEMP Temp  Av °F (36.7 °C)  Min: 97.4 °F (36.3 °C)  Max: 98.3 °F (15.4 °C)   BP Systolic (83XXN), TUF:868 , Min:110 , FFN:524      Diastolic (74LSG), ACJ:13, Min:49, Max:68     PULSE Pulse  Av.2  Min: 77  Max: 92   RR No data recorded   O2 SAT No data recorded   OXYGEN DELIVERY No data recorded     Systemic Examination:   Physical Exam  Head and neck atraumatic, normocephalic    Lymph nodes-no cervical, supraclavicular lymphadenopathy    Neck-no JVP elevation    Lungs - AP diameter of chest increased. Thoracic expansion and diaphragmatic excursion diminished. BS diminished and expiratory phase prolonged. No dullness to percussion or tenderness to palpation. No bronchial breath sounds . Exp rhonchi   CVS- S1, S2 regular. No S3 no S4, no murmurs    Abdomen-nontender, nondistended. Bowel sounds are present. No organomegaly    Lower extremity-no edema    Upper extremity-no edema    Neurological-grossly normal cranial nerves.   No overt motor deficit      DATA REVIEW     Medications:  Scheduled Meds:   carvedilol  3.125 mg Oral BID WC    insulin glargine  12 Units SubCUTAneous Daily    insulin lispro  0-8 Units SubCUTAneous TID WC    insulin lispro  0-4 Units SubCUTAneous Nightly ipratropium-albuterol  1 ampule Inhalation 4x daily    budesonide-formoterol  2 puff Inhalation BID    azithromycin  500 mg IntraVENous Q24H    predniSONE  40 mg Oral Daily    sodium chloride flush  5-40 mL IntraVENous 2 times per day    aspirin  81 mg Oral Daily    atorvastatin  40 mg Oral Nightly    ampicillin-sulbactam  3,000 mg IntraVENous Q6H     Continuous Infusions:   sodium chloride 10 mL/hr at 12/24/22 1847    dextrose       LABS:-  ABG:   No results for input(s): POCPH, POCPCO2, POCPO2, POCHCO3, PWSG4LIK in the last 72 hours.   CBC:   Recent Labs     12/24/22  0525 12/25/22  0455 12/26/22  0942   WBC 8.6 8.3 7.8   HGB 11.8* 11.1* 12.1   HCT 39.1 37.8 40.7   MCV 99.7 102.2 102.5    See Reflexed IPF Result 120*   LYMPHOPCT 13* 19* 22*   RBC 3.92* 3.70* 3.97   MCH 30.1 30.0 30.5   MCHC 30.2 29.4 29.7   RDW 14.9* 14.6* 14.3       BMP:   Recent Labs     12/24/22  0525 12/25/22  0455 12/26/22  0942    133* 137   K 4.6 4.4 4.5   * 100 103   CO2 24 24 24   BUN 27* 29* 20   CREATININE 0.67 0.72 0.67   GLUCOSE 109* 225* 272*       Liver Function Test:   Recent Labs     12/24/22  0525   PROT 5.8*   LABALBU 3.4*   ALT 15   AST 25   ALKPHOS 123*   BILITOT 0.3       Amylase/Lipase:  Recent Labs     12/23/22 2119   LIPASE 16       Coagulation Profile:   Recent Labs     12/25/22  1256 12/25/22  1927 12/26/22  0942   APTT 60.0* 58.2* 34.3*       Cardiac Enzymes:  Recent Labs     12/23/22 2119   CKTOTAL 72       Lactic Acid:  No results found for: LACTA  BNP:   No results found for: BNP  D-Dimer:  No results found for: DDIMER  Others:   Lab Results   Component Value Date    TSH 2.62 12/23/2022     No results found for: BEE, RHEUMFACTOR, SEDRATE, CRP  No results found for: Jerral Harden  No results found for: IRON, TIBC, FERRITIN  No results found for: SPEP, UPEP  No results found for: PSA, CEA, , [id number],     Input/Output:    Intake/Output Summary (Last 24 hours) at 12/26/2022 1900  Last data filed at 12/26/2022 0655  Gross per 24 hour   Intake 1069.5 ml   Output 1100 ml   Net -30.5 ml         Microbiology:  Recent Labs     12/23/22  2110 12/23/22  2115 12/24/22  0026 12/24/22  0245   SPECDESC . BLOOD . BLOOD   < > . NASAL SWAB   SPECIAL UNKNOWN  --   --   --    CULTURE NO GROWTH 2 DAYS NO GROWTH 2 DAYS  --   --     < > = values in this interval not displayed. Pathology:    Radiology reports:  XR CHEST PORTABLE   Final Result   Resolving pulmonary vascular congestion         XR CHEST PORTABLE   Final Result   Perihilar consolidations with interval worsening. CT HEAD WO CONTRAST   Final Result   No acute intracranial abnormality. No acute territorial infarction,   intracranial hemorrhage or mass lesion. Status post right canal wall down mastoidectomy with soft tissue density   within the mastoidectomy bowl. XR CHEST PORTABLE   Final Result   Increased lung markings bilaterally, may be related to mild pulmonary   vascular congestion versus bronchitis. Echocardiogram:   No results found for this or any previous visit. Cardiac Catheterization:   No results found for this or any previous visit. ASSESSMENT AND PLAN     Assessment:    //Acute hypoxic hypercapnic respiratory failure improved   //Chronic hypoxic respiratory failure  //Bilateral pulmonary congestion/infrahilar interstitial infiltrate better   //Bronchiolitis/bronchopneumonia  //COPD exacerbation  //Non-ST elevation MI  //COPD on home oxygen severity not known  //Diabetes mellitus    Plan:  Xray chest is improved . SATs better on 2 l oxygen   Has exp rhonchi   Complete azithromycin for 5 days - can change to po   Can change to Augmentin po for total 7 days   Cont Symbicort and duo neb   Dec prednisone to 30 mg   Can dc from pulmonary standpoint        I updated the patient regarding the current clinical condition, provisional diagnosis and management plan.  I addressed concerns and answered all questions to the best of my abilities. It was my pleasure to evaluate Petty Friend today. We will continue to follow. I would like to thank you for allowing me to participate in the care of this patient. Please feel free to call with any further questions or concerns. Adrian Tim MD, M.D. Pulmonary and Critical Care Medicine           12/26/2022, 7:00 PM    Please note that this chart was generated using voice recognition Dragon dictation software. Although every effort was made to ensure the accuracy of this automated transcription, some errors in transcription may have occurred.

## 2022-12-27 NOTE — PLAN OF CARE
Problem: Discharge Planning  Goal: Discharge to home or other facility with appropriate resources  12/27/2022 0458 by Josie Drummond RN  Outcome: Progressing  12/26/2022 1814 by Lucy Gray RN  Outcome: Progressing     Problem: Pain  Goal: Verbalizes/displays adequate comfort level or baseline comfort level  12/27/2022 0458 by Josie Drummond RN  Outcome: Progressing  12/26/2022 1814 by Lucy Gray RN  Outcome: Progressing     Problem: Respiratory - Adult  Goal: Achieves optimal ventilation and oxygenation  12/27/2022 0458 by Josie Drummond RN  Outcome: Progressing  12/26/2022 1814 by Lucy Gray RN  Outcome: Progressing     Problem: Cardiovascular - Adult  Goal: Maintains optimal cardiac output and hemodynamic stability  12/27/2022 0458 by Josie Drummond RN  Outcome: Progressing  12/26/2022 1814 by Lucy Gray RN  Outcome: Progressing  Goal: Absence of cardiac dysrhythmias or at baseline  12/27/2022 0458 by Josie Drummond RN  Outcome: Progressing  12/26/2022 1814 by Lucy Gray RN  Outcome: Progressing     Problem: Skin/Tissue Integrity - Adult  Goal: Skin integrity remains intact  12/27/2022 0458 by Josie Drummond RN  Outcome: Progressing  Flowsheets (Taken 12/26/2022 2015)  Skin Integrity Remains Intact: Monitor for areas of redness and/or skin breakdown  12/26/2022 1814 by Lucy Gray RN  Outcome: Progressing  Goal: Incisions, wounds, or drain sites healing without S/S of infection  12/27/2022 0458 by Josie Drummond RN  Outcome: Progressing  12/26/2022 1814 by Lucy Gray RN  Outcome: Progressing  Goal: Oral mucous membranes remain intact  12/27/2022 0458 by Josie Drummond RN  Outcome: Progressing  12/26/2022 1814 by Lucy Gray RN  Outcome: Progressing     Problem: Musculoskeletal - Adult  Goal: Return mobility to safest level of function  12/27/2022 0458 by Josie Drummond RN  Outcome: Progressing  12/26/2022 1814 by Lucy Gray RN  Outcome: Progressing  Goal: Maintain proper alignment of affected body part  12/27/2022 0458 by Josie Drummond RN  Outcome: Progressing  12/26/2022 1814 by Lucy Gray RN  Outcome: Progressing  Goal: Return ADL status to a safe level of function  12/27/2022 0458 by Josie Drummond RN  Outcome: Progressing  12/26/2022 1814 by Lucy Gray RN  Outcome: Progressing     Problem: Infection - Adult  Goal: Absence of infection at discharge  12/27/2022 0458 by Josie Drummond RN  Outcome: Progressing  12/26/2022 1814 by Lucy Gray RN  Outcome: Progressing  Goal: Absence of infection during hospitalization  12/27/2022 0458 by Josie Drummond RN  Outcome: Progressing  12/26/2022 1814 by Lucy Gray RN  Outcome: Progressing  Goal: Absence of fever/infection during anticipated neutropenic period  12/27/2022 0458 by Josie Drummond RN  Outcome: Progressing  12/26/2022 1814 by Lucy Gray RN  Outcome: Progressing     Problem: Hematologic - Adult  Goal: Maintains hematologic stability  12/27/2022 0458 by Josie Drummond RN  Outcome: Progressing  12/26/2022 1814 by Lucy Gray RN  Outcome: Progressing     Problem: Skin/Tissue Integrity  Goal: Absence of new skin breakdown  Description: 1. Monitor for areas of redness and/or skin breakdown  2. Assess vascular access sites hourly  3. Every 4-6 hours minimum:  Change oxygen saturation probe site  4. Every 4-6 hours:  If on nasal continuous positive airway pressure, respiratory therapy assess nares and determine need for appliance change or resting period.   12/27/2022 0458 by Josie Drummond RN  Outcome: Progressing  12/26/2022 1814 by Lucy Gray RN  Outcome: Progressing     Problem: Chronic Conditions and Co-morbidities  Goal: Patient's chronic conditions and co-morbidity symptoms are monitored and maintained or improved  12/27/2022 0458 by Kun San RN  Outcome: Progressing  12/26/2022 1814 by Kaushik Hill RN  Outcome: Progressing     Problem: Safety - Adult  Goal: Free from fall injury  12/27/2022 0458 by Kun San RN  Outcome: Rosina Prey (Taken 12/26/2022 2015)  Free From Fall Injury: Instruct family/caregiver on patient safety  12/26/2022 1814 by Kaushik Hill RN  Outcome: Progressing     Problem: ABCDS Injury Assessment  Goal: Absence of physical injury  12/27/2022 0458 by Kun San RN  Outcome: Progressing  Flowsheets (Taken 12/26/2022 2015)  Absence of Physical Injury: Implement safety measures based on patient assessment  12/26/2022 1814 by Kaushik Hill RN  Outcome: Progressing

## 2022-12-27 NOTE — PLAN OF CARE
Problem: Discharge Planning  Goal: Discharge to home or other facility with appropriate resources  12/27/2022 1435 by Marcio Gipson RN  Outcome: Completed  12/27/2022 0458 by Diego Freitas RN  Outcome: Progressing     Problem: Pain  Goal: Verbalizes/displays adequate comfort level or baseline comfort level  12/27/2022 1435 by Marcio Gipson RN  Outcome: Completed  12/27/2022 0458 by Diego Freitas RN  Outcome: Progressing     Problem: Respiratory - Adult  Goal: Achieves optimal ventilation and oxygenation  12/27/2022 1435 by Marcio Gipson RN  Outcome: Completed  12/27/2022 0458 by Diego Freitas RN  Outcome: Progressing     Problem: Cardiovascular - Adult  Goal: Maintains optimal cardiac output and hemodynamic stability  12/27/2022 1435 by Marcio Gipson RN  Outcome: Completed  12/27/2022 0458 by Diego Freitas RN  Outcome: Progressing  Goal: Absence of cardiac dysrhythmias or at baseline  12/27/2022 1435 by Marcio Gipson RN  Outcome: Completed  12/27/2022 0458 by Diego Freitas RN  Outcome: Progressing     Problem: Skin/Tissue Integrity - Adult  Goal: Skin integrity remains intact  12/27/2022 1435 by Marcio Gipson RN  Outcome: Completed  12/27/2022 0458 by Diego Freitas RN  Outcome: Progressing  Flowsheets (Taken 12/26/2022 2015)  Skin Integrity Remains Intact: Monitor for areas of redness and/or skin breakdown  Goal: Incisions, wounds, or drain sites healing without S/S of infection  12/27/2022 1435 by Marcio Gipson RN  Outcome: Completed  12/27/2022 0458 by Diego Freitas RN  Outcome: Progressing  Goal: Oral mucous membranes remain intact  12/27/2022 1435 by Marcio Gipson RN  Outcome: Completed  12/27/2022 0458 by Diego Freitas RN  Outcome: Progressing     Problem: Musculoskeletal - Adult  Goal: Return mobility to safest level of function  12/27/2022 1435 by Francisco Molina RN  Outcome: Completed  12/27/2022 0458 by Georgina Betancourt RN  Outcome: Progressing  Goal: Maintain proper alignment of affected body part  12/27/2022 1435 by Francisco Molina RN  Outcome: Completed  12/27/2022 0458 by Georgina Betancourt RN  Outcome: Progressing  Goal: Return ADL status to a safe level of function  12/27/2022 1435 by Francisco Molina RN  Outcome: Completed  12/27/2022 0458 by Georgina Betancourt RN  Outcome: Progressing     Problem: Infection - Adult  Goal: Absence of infection at discharge  12/27/2022 1435 by Francisco Molina RN  Outcome: Completed  12/27/2022 0458 by Georgina Betancourt RN  Outcome: Progressing  Goal: Absence of infection during hospitalization  12/27/2022 1435 by Francisco Molina RN  Outcome: Completed  12/27/2022 0458 by Georgina Betancourt RN  Outcome: Progressing  Goal: Absence of fever/infection during anticipated neutropenic period  12/27/2022 1435 by Francisco Molina RN  Outcome: Completed  12/27/2022 0458 by Georgina Betancourt RN  Outcome: Progressing     Problem: Hematologic - Adult  Goal: Maintains hematologic stability  12/27/2022 1435 by Francisco Molina RN  Outcome: Completed  12/27/2022 0458 by Georgina Betancourt RN  Outcome: Progressing     Problem: Skin/Tissue Integrity  Goal: Absence of new skin breakdown  Description: 1. Monitor for areas of redness and/or skin breakdown  2. Assess vascular access sites hourly  3. Every 4-6 hours minimum:  Change oxygen saturation probe site  4. Every 4-6 hours:  If on nasal continuous positive airway pressure, respiratory therapy assess nares and determine need for appliance change or resting period.   12/27/2022 1435 by Francisco Molina RN  Outcome: Completed  12/27/2022 0458 by Georgina Betancourt RN  Outcome: Progressing     Problem: Chronic Conditions and Co-morbidities  Goal: Patient's chronic conditions and co-morbidity symptoms are monitored and maintained or improved  12/27/2022 1435 by Marcio Gipson RN  Outcome: Completed  12/27/2022 0458 by Diego Freitas RN  Outcome: Progressing     Problem: Safety - Adult  Goal: Free from fall injury  12/27/2022 1435 by Marcio Gipson RN  Outcome: Completed  12/27/2022 0458 by Diego Freitas RN  Outcome: Progressing  Flowsheets (Taken 12/26/2022 2015)  Free From Fall Injury: Elijahneris Peewee family/caregiver on patient safety     Problem: ABCDS Injury Assessment  Goal: Absence of physical injury  12/27/2022 1435 by Marcio Gipson RN  Outcome: Completed  12/27/2022 0458 by Diego Freitas RN  Outcome: Progressing  Flowsheets (Taken 12/26/2022 2015)  Absence of Physical Injury: Implement safety measures based on patient assessment

## 2022-12-27 NOTE — PROGRESS NOTES
Physician Progress Note      PATIENT:               Kiera Blanco  CSN #:                  [id number]  :                       1955  ADMIT DATE:       2022 8:05 PM  [address] [place] DATE:  RESPONDING  PROVIDER #:        Keshawn Fisher MD          QUERY TEXT:    Pt admitted with unresponsive/hypoglycemia. Noted documentation of Type II   NSTEMI on  by ordered Cardiology Consultant. If possible, please   document in progress notes and discharge summary:    The medical record reflects the following:  Risk Factors: age, DM, HTN  Clinical Indicators: Troponins 21, 210, 194, 134, 11,81, 82. pt w/o c/o Chest   pain  Treatment: Cardiology consult, Heparin gtt, ASA, trend Troponins, 2d Echo    Thank you for your time, Kathia Bower MSN, RN CDS. Please call/text/PS with any   questions; [phone]. Options provided:  -- Type II NSTEMI confirmed present on admission  -- Type II NSTEMI ruled out  -- Defer to Cardiology consultant documentation regarding elevated troponin  -- Other - I will add my own diagnosis  -- Disagree - Not applicable / Not valid  -- Disagree - Clinically unable to determine / Unknown  -- Refer to Clinical Documentation Reviewer    PROVIDER RESPONSE TEXT:    The diagnosis of Type II NSTEMI was ruled out.     Query created by: Shelly Sesay on 2022 7:39 AM      Electronically signed by:  Keshawn Fisher MD 2022 11:47 AM

## 2022-12-27 NOTE — PROGRESS NOTES
CLINICAL PHARMACY NOTE: MEDS TO BEDS    Total # of Prescriptions Filled: 5   The following medications were delivered to the patient:  AUGMENTIN 875  PREDNISONE 10  ASA CHEW   SYMBICORT 160  AZITHROMYCIN 250     Additional Documentation:

## 2022-12-27 NOTE — PROGRESS NOTES
Discharge paper work signed by patient and explained by RN, all questions answered. IV removed. Patient left unit on wheelchair with aide who brought her down to her ride from family member with all belongings and meds.

## 2022-12-27 NOTE — PROGRESS NOTES
[facility]  Office: [address], DO, Galina Hamilton, DO, Elijah Livanjagdish, DO, Francisca Cruz, DO, Ronda Barros MD, Pelon Mclean MD, Mayra Bo MD, Ewelina Washington MD,  Debra Munroe MD, Kaela Babb MD, Fernanda Corona, DO, Saeid Daniel MD,  Drake Pham MD, Artur Hardy MD, Nereida Lopez, DO, Sonia Gallagher MD, Rodney Benavides MD, Brandy Donaldson, DO, Sarah Alejandro MD, Marco Reardon MD, Chelly Manrique MD, Keyonna Rodgers MD, Saurabh Lobo DO, Anna Nguyen MD, Marcelina Long MD, Marquis Barreto, CNP,  Sunday Chaudhry, CNP, Brock Berry, CNP, Claire Rapp, CNP,  Rah Giles, [facility], Florecita Aguero, CNP, Gopi Aguilar, CNP, Dinorah Ojeda, CNP, Melanie Browne, CNP, French Virk, CNP, Tara Gregory PAChachoC, Steve Koo, CNS, Allan Giraldo, CNP, Chai Mohamud, CNP         Yumiko Mejia [address]    Progress Note    12/27/2022    11:40 AM    Name:   Belgica Hill  MRN:     [id number]     Acct:      [de-identified]   Room:   [address] [place], [place] [address] Day:  4  Admit Date:  12/23/2022  8:05 PM    PCP:   No primary care provider on file. Code Status:  Full Code    Subjective:     C/C:   Chief Complaint   Patient presents with    Altered Mental Status     Interval History Status: Patient seen and examined at bedside. Overall doing much better. No acute issues overnight. Brief History:     71-year-old female past medical history of diabetes with insulin pump, COPD, presented with altered mental status found to be hypoglycemic after insulin bolus and currently being treated for aspiration pneumonia with Unasyn and azithromycin changed to azithromycin and Augmentin on discharge. Patient follow-up with PCP as outpatient. Review of Systems:     Review of Systems   Constitutional:  Negative for activity change, appetite change, chills and fever.    HENT:  Negative for congestion, ear pain, facial swelling and (gastroesophageal reflux disease), HTN (hypertension), and Opioid overdose ([address] Social History:        Family History: No family history on file. Vitals:  BP (!) 142/63   Pulse 77   Temp 97.9 °F (36.6 °C) (Oral)   Resp 18   Ht 5' (1.524 m)   Wt 145 lb (65.8 kg)   SpO2 99%   BMI 28.32 kg/m²   Temp (24hrs), Av °F (36.7 °C), Min:97.9 °F (36.6 °C), Max:98.1 °F (36.7 °C)    Recent Labs     22  16222  21022  0017 22  0742   POCGLU 390* 329* 215* 152*       I/O (24Hr): Intake/Output Summary (Last 24 hours) at 2022 1140  Last data filed at 2022 1134  Gross per 24 hour   Intake --   Output 1300 ml   Net -1300 ml       Labs:  Hematology:  Recent Labs     22  0455 22  0942   WBC 8.3 7.8   RBC 3.70* 3.97   HGB 11.1* 12.1   HCT 37.8 40.7   .2 102.5   MCH 30.0 30.5   MCHC 29.4 29.7   RDW 14.6* 14.3   PLT See Reflexed IPF Result 120*   MPV  --  12.3     Chemistry:  Recent Labs     22  0455 22  1231 22  1602 22  0942   *  --   --  137   K 4.4  --   --  4.5     --   --  103   CO2 24  --   --  24   GLUCOSE 225*  --   --  272*   BUN 29*  --   --  20   CREATININE 0.72  --   --  0.67   ANIONGAP 9  --   --  10   LABGLOM >60  --   --  >60   CALCIUM 8.4*  --   --  8.9   TROPHS 111* 81* 82*  --      Recent Labs     22  0829 22  1115 22  1629 22  21022  0017 22  0742   POCGLU 268* 312* 390* 329* 215* 152*     ABG:  Lab Results   Component Value Date/Time    FIO2 INFORMATION NOT PROVIDED 2022 09:19 PM     Lab Results   Component Value Date/Time    SPECIAL UNKNOWN 2022 09:10 PM     Lab Results   Component Value Date/Time    CULTURE NO GROWTH 3 DAYS 2022 09:15 PM       Radiology:  CT HEAD WO CONTRAST    Result Date: 2022  No acute intracranial abnormality. No acute territorial infarction, intracranial hemorrhage or mass lesion.  Status post right canal wall down mastoidectomy with soft tissue density within the mastoidectomy bowl. XR CHEST PORTABLE    Result Date: 12/26/2022  Resolving pulmonary vascular congestion     XR CHEST PORTABLE    Result Date: 12/24/2022  Perihilar consolidations with interval worsening. XR CHEST PORTABLE    Result Date: 12/23/2022  Increased lung markings bilaterally, may be related to mild pulmonary vascular congestion versus bronchitis. Physical Examination:        Physical Exam  Constitutional:       Comments: Chronically ill-appearing   HENT:      Head: Normocephalic and atraumatic. Right Ear: External ear normal.      Left Ear: External ear normal.      Nose: Nose normal.      Mouth/Throat:      Mouth: Mucous membranes are moist.   Eyes:      Pupils: Pupils are equal, round, and reactive to light. Cardiovascular:      Rate and Rhythm: Normal rate and regular rhythm. Heart sounds: No murmur heard. Pulmonary:      Breath sounds: No wheezing or rales. Comments: Decreased breath sounds specially on the right side  Abdominal:      General: Bowel sounds are normal.      Palpations: Abdomen is soft. Musculoskeletal:      Right lower leg: No edema. Left lower leg: No edema. Skin:     General: Skin is dry. Capillary Refill: Capillary refill takes less than 2 seconds. Coloration: Skin is pale. Neurological:      General: No focal deficit present. Mental Status: She is alert and oriented to person, place, and time.    Psychiatric:         Mood and Affect: Mood normal.         Behavior: Behavior normal.       Assessment:        Hospital Problems             Last Modified POA    * (Principal) Aspiration pneumonia due to gastric secretions ([address] 12/26/2022 Yes    COPD with acute exacerbation ([address] 12/26/2022 Yes    Altered mental status 12/26/2022 Yes    Elevated troponin level not due myocardial infarction 12/26/2022 Yes    Toxic metabolic encephalopathy 14/67/7585 Yes    Hypoglycemia 12/25/2022 Yes Plan:        Change medications to p.o.   Discharge planning today  Encephalopathy resolved  Encourage ambulation      Keshawn Fisher MD  12/27/2022  11:40 AM

## 2022-12-27 NOTE — DISCHARGE SUMMARY
[facility]  Office: [address], DO, Brenna Walden, DO, Nani Urias, DO, Greg Neely Blood, DO, Jael Alvarez MD, Nena Marin MD, Morgan Abraham MD, Roderick Hudson MD,  Lavon Shepherd MD, Norma Luna MD, Isabelle Russo, DO, Brigitte Luevano MD,  Suzanne Tineo MD, Oscar Stauffer MD, Julita Eid DO, Abhishek Al MD, Clayton Linder MD, Yolette Vergara DO, Poly Read MD, Rox Logan MD, Hunter Mendez MD, Renetta Brewer MD, Ger Vieyra DO, Nesha Peres MD, Katie Martinez MD, Selwyn Aguero, CNP,  Corey Saavedra, CNP, Joe Quiñonez, CNP, Nikki Carlson, CNP,  Mccoy Baumgarten, [facility], Kimberli Esquivel, CNP, Hailee Bartlett, CNP, Anand Rojas, CNP, Fabi Ferrara, CNP, Biju Caal, CNP, Hans Anna PAChachoC, Rod Tamayo, CNS, Mera Peitt, CNP, Shira [facility]    Discharge Summary     Patient ID: Belgica English  :  1955   MRN: [id number]     ACCOUNT:  [de-identified]   Patient's PCP: No primary care provider on file. Admit Date: 2022   Discharge Date: 2022     Length of Stay: 4  Code Status:  Prior  Admitting Physician: Shannon Cho MD  Discharge Physician: Brigitte Luevano MD     Active Discharge Diagnoses:     Hospital Problem Lists:  Principal Problem:    Aspiration pneumonia due to gastric secretions [facility])  Active Problems:    COPD with acute exacerbation ([facility]    Altered mental status    Elevated troponin level not due myocardial infarction    Toxic metabolic encephalopathy    Hypoglycemia  Resolved Problems:    * No resolved hospital problems.  *      Admission Condition:  stable     Discharged Condition: stable    Hospital Stay:     Hospital Course:  Belgica English is a 78 y.o. female who was admitted for the management of   Aspiration pneumonia due to gastric secretions [facility]) , presented to ER with Altered Mental Status    78year-old female past medical history of diabetes with insulin pump, COPD, presented with altered mental status found to be hypoglycemic after insulin bolus and currently being treated for aspiration pneumonia with Unasyn and azithromycin changed to azithromycin and Augmentin on discharge. Patient follow-up with PCP as outpatient. Significant therapeutic interventions: N/A  Significant Diagnostic Studies:   Labs / Micro:  CBC:   Lab Results   Component Value Date/Time    WBC 7.8 12/26/2022 09:42 AM    RBC 3.97 12/26/2022 09:42 AM    HGB 12.1 12/26/2022 09:42 AM    HCT 40.7 12/26/2022 09:42 AM    .5 12/26/2022 09:42 AM    MCH 30.5 12/26/2022 09:42 AM    MCHC 29.7 12/26/2022 09:42 AM    RDW 14.3 12/26/2022 09:42 AM     12/26/2022 09:42 AM     BMP:    Lab Results   Component Value Date/Time    GLUCOSE 272 12/26/2022 09:42 AM     12/26/2022 09:42 AM    K 4.5 12/26/2022 09:42 AM     12/26/2022 09:42 AM    CO2 24 12/26/2022 09:42 AM    ANIONGAP 10 12/26/2022 09:42 AM    BUN 20 12/26/2022 09:42 AM    CREATININE 0.67 12/26/2022 09:42 AM    CALCIUM 8.9 12/26/2022 09:42 AM    LABGLOM >60 12/26/2022 09:42 AM        Radiology:  CT HEAD WO CONTRAST    Result Date: 12/23/2022  No acute intracranial abnormality. No acute territorial infarction, intracranial hemorrhage or mass lesion. Status post right canal wall down mastoidectomy with soft tissue density within the mastoidectomy bowl. XR CHEST PORTABLE    Result Date: 12/26/2022  Resolving pulmonary vascular congestion     XR CHEST PORTABLE    Result Date: 12/24/2022  Perihilar consolidations with interval worsening. XR CHEST PORTABLE    Result Date: 12/23/2022  Increased lung markings bilaterally, may be related to mild pulmonary vascular congestion versus bronchitis.        Consultations:    Consults:     Final Specialist Recommendations/Findings:   IP CONSULT TO CRITICAL CARE  IP CONSULT TO CARDIOLOGY  IP CONSULT TO SOCIAL WORK      The patient was seen and examined on day of discharge and this discharge summary is in conjunction with any daily progress note from day of discharge. Discharge plan:     Disposition: Home    Physician Follow Up: [facility]  [address] [place], [place] [address] [phone]  Schedule an appointment as soon as possible for a visit in 1 week(s)  for hospital f/u with cardiology       Requiring Further Evaluation/Follow Up POST HOSPITALIZATION/Incidental Findings: Follow-up PCP, monitor glucose, azithromycin and Augmentin for aspiration pneumonia follow-up cardiology as outpatient    Diet: regular diet    Activity: As tolerated    Instructions to Patient:  Follow-up PCP, monitor glucose, azithromycin and Augmentin for aspiration pneumonia follow-up cardiology as outpatient    Discharge Medications:      Medication List        START taking these medications      amoxicillin-clavulanate 875-125 MG per tablet  Commonly known as: AUGMENTIN  Take 1 tablet by mouth 2 times daily for 3 days     aspirin 81 MG chewable tablet  Take 1 tablet by mouth daily  Start taking on: December 28, 2022     azithromycin 250 MG tablet  Commonly known as: Zithromax  Take 1 tablet by mouth daily for 3 days     budesonide-formoterol 160-4.5 MCG/ACT Aero  Commonly known as: SYMBICORT  Inhale 2 puffs into the lungs in the morning and 2 puffs in the evening.      predniSONE 10 MG tablet  Commonly known as: DELTASONE  Take 3 tablets by mouth daily for 2 doses  Start taking on: December 28, 2022            CONTINUE taking these medications      Insulin Pump LT3210 Kit     montelukast 10 MG tablet  Commonly known as: SINGULAIR     Mucinex 600 MG extended release tablet  Generic drug: guaiFENesin     SEROQUEL PO     Trelegy Ellipta 200-62.5-25 MCG/ACT Aepb inhaler  Generic drug: fluticasone-umeclidin-vilant     Vitamin D3 1.25 MG (84004 UT) Caps     ZOCOR PO               Where to Get Your Medications        These medications were sent to [facility] [address] [facility], [address] [facility]  [address], [address] [place] [address]      Phone: [phone]   amoxicillin-clavulanate 875-125 MG per tablet  aspirin 81 MG chewable tablet  budesonide-formoterol 160-4.5 MCG/ACT Aero  predniSONE 10 MG tablet       You can get these medications from any pharmacy    Bring a paper prescription for each of these medications  azithromycin 250 MG tablet         No discharge procedures on file. Time Spent on discharge is  20 mins in patient examination, evaluation, counseling as well as medication reconciliation, prescriptions for required medications, discharge plan and follow up. Electronically signed by   Mynor Rubalcava MD  12/27/2022  6:23 PM      Thank you Dr. Katie Chaudhari primary care provider on file. for the opportunity to be involved in this patient's care.

## 2022-12-28 LAB
CULTURE: NORMAL
CULTURE: NORMAL
Lab: NORMAL
SPECIMEN DESCRIPTION: NORMAL
SPECIMEN DESCRIPTION: NORMAL